# Patient Record
Sex: MALE | Race: WHITE | NOT HISPANIC OR LATINO | URBAN - METROPOLITAN AREA
[De-identification: names, ages, dates, MRNs, and addresses within clinical notes are randomized per-mention and may not be internally consistent; named-entity substitution may affect disease eponyms.]

---

## 2018-09-27 ENCOUNTER — INPATIENT (INPATIENT)
Facility: HOSPITAL | Age: 58
LOS: 8 days | Discharge: HOME | DRG: 435 | End: 2018-10-06
Attending: STUDENT IN AN ORGANIZED HEALTH CARE EDUCATION/TRAINING PROGRAM | Admitting: STUDENT IN AN ORGANIZED HEALTH CARE EDUCATION/TRAINING PROGRAM
Payer: MEDICARE

## 2018-09-27 VITALS
TEMPERATURE: 98 F | OXYGEN SATURATION: 98 % | DIASTOLIC BLOOD PRESSURE: 76 MMHG | RESPIRATION RATE: 16 BRPM | HEART RATE: 85 BPM | WEIGHT: 130.07 LBS | SYSTOLIC BLOOD PRESSURE: 125 MMHG

## 2018-09-27 DIAGNOSIS — Z91.89 OTHER SPECIFIED PERSONAL RISK FACTORS, NOT ELSEWHERE CLASSIFIED: ICD-10-CM

## 2018-09-27 DIAGNOSIS — M19.90 UNSPECIFIED OSTEOARTHRITIS, UNSPECIFIED SITE: ICD-10-CM

## 2018-09-27 DIAGNOSIS — R63.8 OTHER SYMPTOMS AND SIGNS CONCERNING FOOD AND FLUID INTAKE: ICD-10-CM

## 2018-09-27 DIAGNOSIS — Z98.89 OTHER SPECIFIED POSTPROCEDURAL STATES: Chronic | ICD-10-CM

## 2018-09-27 DIAGNOSIS — K86.9 DISEASE OF PANCREAS, UNSPECIFIED: ICD-10-CM

## 2018-09-27 DIAGNOSIS — B20 HUMAN IMMUNODEFICIENCY VIRUS [HIV] DISEASE: ICD-10-CM

## 2018-09-27 DIAGNOSIS — Z29.9 ENCOUNTER FOR PROPHYLACTIC MEASURES, UNSPECIFIED: ICD-10-CM

## 2018-09-27 DIAGNOSIS — F32.9 MAJOR DEPRESSIVE DISORDER, SINGLE EPISODE, UNSPECIFIED: ICD-10-CM

## 2018-09-27 LAB
ALBUMIN SERPL ELPH-MCNC: 3.6 G/DL — SIGNIFICANT CHANGE UP (ref 3.4–5)
ALP SERPL-CCNC: 1220 U/L — HIGH (ref 40–120)
ALT FLD-CCNC: 241 U/L — HIGH (ref 12–42)
ANION GAP SERPL CALC-SCNC: 12 MMOL/L — SIGNIFICANT CHANGE UP (ref 9–16)
AST SERPL-CCNC: 168 U/L — HIGH (ref 15–37)
BASOPHILS NFR BLD AUTO: 0.6 % — SIGNIFICANT CHANGE UP (ref 0–2)
BILIRUB SERPL-MCNC: 9.4 MG/DL — HIGH (ref 0.2–1.2)
BUN SERPL-MCNC: 15 MG/DL — SIGNIFICANT CHANGE UP (ref 7–23)
CALCIUM SERPL-MCNC: 9.4 MG/DL — SIGNIFICANT CHANGE UP (ref 8.5–10.5)
CHLORIDE SERPL-SCNC: 97 MMOL/L — SIGNIFICANT CHANGE UP (ref 96–108)
CO2 SERPL-SCNC: 24 MMOL/L — SIGNIFICANT CHANGE UP (ref 22–31)
CREAT SERPL-MCNC: 1.2 MG/DL — SIGNIFICANT CHANGE UP (ref 0.5–1.3)
EOSINOPHIL NFR BLD AUTO: 3.6 % — SIGNIFICANT CHANGE UP (ref 0–6)
GLUCOSE SERPL-MCNC: 113 MG/DL — HIGH (ref 70–99)
HCT VFR BLD CALC: 36.4 % — LOW (ref 39–50)
HGB BLD-MCNC: 12.5 G/DL — LOW (ref 13–17)
IMM GRANULOCYTES NFR BLD AUTO: 0.4 % — SIGNIFICANT CHANGE UP (ref 0–1.5)
LIDOCAIN IGE QN: 65 U/L — LOW (ref 73–393)
LYMPHOCYTES # BLD AUTO: 10.9 % — LOW (ref 13–44)
MCHC RBC-ENTMCNC: 32.3 PG — SIGNIFICANT CHANGE UP (ref 27–34)
MCHC RBC-ENTMCNC: 34.3 G/DL — SIGNIFICANT CHANGE UP (ref 32–36)
MCV RBC AUTO: 94.1 FL — SIGNIFICANT CHANGE UP (ref 80–100)
MONOCYTES NFR BLD AUTO: 8.1 % — SIGNIFICANT CHANGE UP (ref 2–14)
NEUTROPHILS NFR BLD AUTO: 76.4 % — SIGNIFICANT CHANGE UP (ref 43–77)
PLATELET # BLD AUTO: 194 K/UL — SIGNIFICANT CHANGE UP (ref 150–400)
POTASSIUM SERPL-MCNC: 3.6 MMOL/L — SIGNIFICANT CHANGE UP (ref 3.5–5.3)
POTASSIUM SERPL-SCNC: 3.6 MMOL/L — SIGNIFICANT CHANGE UP (ref 3.5–5.3)
PROT SERPL-MCNC: 7.6 G/DL — SIGNIFICANT CHANGE UP (ref 6.4–8.2)
RBC # BLD: 3.87 M/UL — LOW (ref 4.2–5.8)
RBC # FLD: 14.3 % — SIGNIFICANT CHANGE UP (ref 10.3–14.5)
SODIUM SERPL-SCNC: 133 MMOL/L — SIGNIFICANT CHANGE UP (ref 132–145)
WBC # BLD: 9.1 K/UL — SIGNIFICANT CHANGE UP (ref 3.8–10.5)
WBC # FLD AUTO: 9.1 K/UL — SIGNIFICANT CHANGE UP (ref 3.8–10.5)

## 2018-09-27 PROCEDURE — 71046 X-RAY EXAM CHEST 2 VIEWS: CPT | Mod: 26

## 2018-09-27 PROCEDURE — 99285 EMERGENCY DEPT VISIT HI MDM: CPT | Mod: 25

## 2018-09-27 PROCEDURE — 99222 1ST HOSP IP/OBS MODERATE 55: CPT | Mod: GC

## 2018-09-27 RX ORDER — BUPROPION HYDROCHLORIDE 150 MG/1
150 TABLET, EXTENDED RELEASE ORAL EVERY 24 HOURS
Qty: 0 | Refills: 0 | Status: DISCONTINUED | OUTPATIENT
Start: 2018-09-27 | End: 2018-10-06

## 2018-09-27 RX ORDER — SODIUM CHLORIDE 9 MG/ML
3 INJECTION INTRAMUSCULAR; INTRAVENOUS; SUBCUTANEOUS ONCE
Qty: 0 | Refills: 0 | Status: COMPLETED | OUTPATIENT
Start: 2018-09-27 | End: 2018-09-27

## 2018-09-27 RX ORDER — OXYCODONE HYDROCHLORIDE 5 MG/1
60 TABLET ORAL EVERY 12 HOURS
Qty: 0 | Refills: 0 | Status: DISCONTINUED | OUTPATIENT
Start: 2018-09-28 | End: 2018-10-04

## 2018-09-27 RX ORDER — SODIUM CHLORIDE 9 MG/ML
1000 INJECTION INTRAMUSCULAR; INTRAVENOUS; SUBCUTANEOUS ONCE
Qty: 0 | Refills: 0 | Status: COMPLETED | OUTPATIENT
Start: 2018-09-27 | End: 2018-09-27

## 2018-09-27 RX ORDER — ALPRAZOLAM 0.25 MG
1 TABLET ORAL EVERY 24 HOURS
Qty: 0 | Refills: 0 | Status: DISCONTINUED | OUTPATIENT
Start: 2018-09-27 | End: 2018-10-04

## 2018-09-27 RX ORDER — SODIUM CHLORIDE 9 MG/ML
1000 INJECTION INTRAMUSCULAR; INTRAVENOUS; SUBCUTANEOUS
Qty: 0 | Refills: 0 | Status: DISCONTINUED | OUTPATIENT
Start: 2018-09-27 | End: 2018-09-28

## 2018-09-27 RX ORDER — EMTRICITABINE AND TENOFOVIR DISOPROXIL FUMARATE 200; 300 MG/1; MG/1
1 TABLET, FILM COATED ORAL EVERY 24 HOURS
Qty: 0 | Refills: 0 | Status: DISCONTINUED | OUTPATIENT
Start: 2018-09-27 | End: 2018-10-06

## 2018-09-27 RX ORDER — HEPARIN SODIUM 5000 [USP'U]/ML
5000 INJECTION INTRAVENOUS; SUBCUTANEOUS EVERY 8 HOURS
Qty: 0 | Refills: 0 | Status: DISCONTINUED | OUTPATIENT
Start: 2018-09-27 | End: 2018-10-04

## 2018-09-27 RX ORDER — HYDROMORPHONE HYDROCHLORIDE 2 MG/ML
8 INJECTION INTRAMUSCULAR; INTRAVENOUS; SUBCUTANEOUS EVERY 6 HOURS
Qty: 0 | Refills: 0 | Status: DISCONTINUED | OUTPATIENT
Start: 2018-09-27 | End: 2018-10-04

## 2018-09-27 RX ORDER — DARUNAVIR ETHANOLATE AND COBICISTAT 800; 150 MG/1; MG/1
1 TABLET, FILM COATED ORAL EVERY 24 HOURS
Qty: 0 | Refills: 0 | Status: DISCONTINUED | OUTPATIENT
Start: 2018-09-27 | End: 2018-10-06

## 2018-09-27 RX ADMIN — Medication 1 MILLIGRAM(S): at 22:27

## 2018-09-27 RX ADMIN — DARUNAVIR ETHANOLATE AND COBICISTAT 1 TABLET(S): 800; 150 TABLET, FILM COATED ORAL at 22:27

## 2018-09-27 RX ADMIN — SODIUM CHLORIDE 3 MILLILITER(S): 9 INJECTION INTRAMUSCULAR; INTRAVENOUS; SUBCUTANEOUS at 15:08

## 2018-09-27 RX ADMIN — EMTRICITABINE AND TENOFOVIR DISOPROXIL FUMARATE 1 TABLET(S): 200; 300 TABLET, FILM COATED ORAL at 22:27

## 2018-09-27 RX ADMIN — SODIUM CHLORIDE 1000 MILLILITER(S): 9 INJECTION INTRAMUSCULAR; INTRAVENOUS; SUBCUTANEOUS at 15:08

## 2018-09-27 RX ADMIN — HEPARIN SODIUM 5000 UNIT(S): 5000 INJECTION INTRAVENOUS; SUBCUTANEOUS at 22:26

## 2018-09-27 RX ADMIN — BUPROPION HYDROCHLORIDE 150 MILLIGRAM(S): 150 TABLET, EXTENDED RELEASE ORAL at 22:27

## 2018-09-27 RX ADMIN — SODIUM CHLORIDE 100 MILLILITER(S): 9 INJECTION INTRAMUSCULAR; INTRAVENOUS; SUBCUTANEOUS at 22:46

## 2018-09-27 NOTE — H&P ADULT - ASSESSMENT
57 year old man with past medical history of HIV (CD4 195, viral load undetectable), GERD, osteoarthiritis, and bilateral sciatic presents to the ED for medical evaluation due to elevated liver enzymes noted by his PMD

## 2018-09-27 NOTE — H&P ADULT - PROBLEM SELECTOR PLAN 3
Patient with extensive pain medication for arthritis   - c/w home medications to avoid withdrawal:  - hydromorphone 8mg q 6 hours prn for breakthrough  - oxycontin 60mg q 12 hours standing

## 2018-09-27 NOTE — ED ADULT NURSE NOTE - DISCHARGE DATE/TIME
": 1949  PHONE #'s: [phone] (home) none (work)    PRESENTING PROBLEM:  Just checked his BP at home katherine he felt kind of shaky and it was 183/97. RN asked when he last ate and he said he ate a piece of toast at 7 AM this morning and hasn't eaten anything else since.     NURSING ASSESSMENT  Description: As above. Denies chest pain, NO SOB,  NO headache or blurred vision,  NO nausea or diaphoresis.   Onset/duration:  today  Precip. factors:   Etiology unknown.   Assoc. Sx:   Shaky.   Improves/worsens Sx:  same  Pain scale (1-10)   0/10  Sx specific meds:  \" I was just put on Effexor XR and Norvasc. My pharmacist told me I should be on a betablocker with this Norvasc.\"   RN asked if he is taking his Metoprolol?  \" NO, Dr. Bosch told me to switch to the Losartan on 17. Am I supposed to be taking it along with the New medication?  I guess I really don't know?  I do have some still. \"  Last exam/Tx:   17    RECOMMENDED DISPOSITION:  Home care advice - RN instructed patient to go eats something as it has almost been 8 hours since he last ate anything. Then rest for about a 1/2 hour , then recheck his bp to see if it has come back down since he should no longer be feeling shaky after eating a relaxing. RN will forward message about Metoprolol to PCP.........  .Seek emergency care Immediately If Any of the Following Occur:  * continuous chest pain accompanied with chest tightness, pressure, or  if it is accompanied by:    * Shortness of breath    * dizziness    * Cool, moist skin    * Nausea or vomiting    * Pain in the neck, shoulders, jaw, teeth, back , or arms.     * Blue of gray face, lips, earlobes, or fingernails,    * heart palpiataions.   * NO relief from repeated Nitroglycerin every 5 minutes for three doses.      Will comply with recommendation: YES  If further questions/concerns or if Sx do not improve, worsen or new Sx develop, call your PCP or [facility] as soon as " possible.    NOTES:  Disposition was determined by the first positive assessment question, therefore all previous assessment questions were negative.  Informed to check provider manual or call insurance company to assure coverage.    Guideline used: Hypertension  Telephone Triage Protocols for Nurses, Fifth Edition, Jade Kraus RN  May 8, 2017     27-Sep-2018 18:10

## 2018-09-27 NOTE — ED PROVIDER NOTE - CHPI ED SYMPTOMS NEG
no dysuria/no fever/no hematuria/no vomiting/no blood in stool/no diarrhea/no chills/no nausea/no itching, no rash

## 2018-09-27 NOTE — H&P ADULT - NSHPSOCIALHISTORY_GEN_ALL_CORE
Former smoker quit 10 years ago, smoked for 20 years, no drinking, no drugs Former smoker quit 2months, smoked for 20 years a pack a day, no drinking, no drugs

## 2018-09-27 NOTE — ED ADULT TRIAGE NOTE - CHIEF COMPLAINT QUOTE
pt presents to ED due to elevated liver enzymes that were normal in June 2018. as per pt, noticed skin yellowing approx 4 days ago with abdominal discomfort. pt has CT of abdomen done approx. 1hr PTA (has CD and written results).

## 2018-09-27 NOTE — H&P ADULT - NSHPPHYSICALEXAM_GEN_ALL_CORE
VITAL SIGNS:  T(F): 98.3 (09-27-18 @ 19:45), Max: 98.7 (09-27-18 @ 17:24)  HR: 90 (09-27-18 @ 19:45) (73 - 90)  BP: 130/77 (09-27-18 @ 19:45) (125/76 - 133/80)  BP(mean): --  RR: 16 (09-27-18 @ 19:45) (16 - 16)  SpO2: 99% (09-27-18 @ 19:45) (98% - 100%)    PHYSICAL EXAM:    Constitutional: WDWN resting comfortably in bed; NAD  HEENT: NC/AT, PERRL, EOMI, anicteric sclera, no nasal discharge; uvula midline, no oropharyngeal erythema or exudates; MMM  Neck: supple; no JVD or thyromegaly  Respiratory: CTA B/L; no W/R/R, no retractions  Cardiac: +S1/S2; RRR; no M/R/G; PMI non-displaced  Gastrointestinal: soft, NT/ND; no rebound or guarding; +BSx4  Genitourinary: normal external genitalia  Back: spine midline, no bony tenderness or step-offs; no CVAT B/L  Extremities: WWP, no clubbing or cyanosis; no peripheral edema  Musculoskeletal: NROM x4; no joint swelling, tenderness or erythema  Vascular: 2+ radial, femoral, DP/PT pulses B/L  Dermatologic: skin warm, dry and intact; no rashes, wounds, or scars  Lymphatic: no submandibular or cervical LAD  Neurologic: AAOx3; CNII-XII grossly intact; no focal deficits  Psychiatric: affect and characteristics of appearance, verbalizations, behaviors are appropriate, denies SI/HI/AH/VH VITAL SIGNS:  T(F): 98.3 (09-27-18 @ 19:45), Max: 98.7 (09-27-18 @ 17:24)  HR: 90 (09-27-18 @ 19:45) (73 - 90)  BP: 130/77 (09-27-18 @ 19:45) (125/76 - 133/80)  BP(mean): --  RR: 16 (09-27-18 @ 19:45) (16 - 16)  SpO2: 99% (09-27-18 @ 19:45) (98% - 100%)    PHYSICAL EXAM:  Constitutional: thin appearing gentleman; NAD  HEENT: NC/AT, PERRL, EOMI, anicteric sclera, no nasal discharge; uvula midline, no oropharyngeal erythema or exudates; DMM  Neck: supple; no JVD or thyromegaly  Respiratory: CTA B/L; no W/R/R, no retractions  Cardiac: +S1/S2; RRR; no M/R/G; PMI non-displaced  Gastrointestinal: soft, NT/ND; no rebound or guarding; +BSx4  Back: no CVAT B/L  Extremities: WWP, no clubbing or cyanosis; no peripheral edema  Vascular: 2+ radial, femoral, DP/PT pulses B/L  Dermatologic: skin warm, dry and intact; no rashes, wounds, or scars. Jaundiced.  Lymphatic: no submandibular or cervical LAD  Neurologic: AAOx3; CNII-XII grossly intact; no focal deficits  Psychiatric: affect and characteristics of appearance, verbalizations, behaviors are appropriate, denies SI/HI/AH/VH

## 2018-09-27 NOTE — H&P ADULT - PROBLEM SELECTOR PLAN 7
1) PCP Contacted on Admission: (Y/N) --> Name & Phone #: Dr. Sommers  2) Date of Contact with PCP:  3) PCP Contacted at Discharge: (Y/N)  4) Summary of Handoff Given to PCP:   5) Post-Discharge Appointment Date and Location:

## 2018-09-27 NOTE — H&P ADULT - NSHPLABSRESULTS_GEN_ALL_CORE
LABS:                         12.5   9.1   )-----------( 194      ( 27 Sep 2018 15:11 )             36.4     09-27    133  |  97  |  15  ----------------------------<  113<H>  3.6   |  24  |  1.20    Ca    9.4      27 Sep 2018 15:11    TPro  7.6  /  Alb  3.6  /  TBili  9.4<H>  /  DBili  x   /  AST  168<H>  /  ALT  241<H>  /  AlkPhos  1220<H>  09-27                  RADIOLOGY, EKG & ADDITIONAL TESTS: Reviewed.

## 2018-09-27 NOTE — ED PROVIDER NOTE - PROGRESS NOTE DETAILS
LFT's are remarkably elevated. CXR negative, will admit to medicine for pancreatic mass and jaundice. accepted to regional medicine by Dr Hope    PCP  accepted to regional medicine by Dr Hope    PCP Dr. Sommers [phone]  Dr Dominguez [phone]

## 2018-09-27 NOTE — H&P ADULT - NSHPREVIEWOFSYSTEMS_GEN_ALL_CORE
REVIEW OF SYSTEMS:  CONSTITUTIONAL: Patient denies weakness, fevers or chills  EYES/ENT: Patient denies visual changes;  denies vertigo or throat pain   NECK: Patient denies pain or stiffness  RESPIRATORY: Patient denies cough, wheezing, hemoptysis; denies shortness of breath  CARDIOVASCULAR: Patient denies chest pain or palpitations  GASTROINTESTINAL: +abdominal pain and cramping,  no nausea, vomiting, or hematemesis, diarrhea or constipation, melena or hematochezia.  GENITOURINARY: Patient denies dysuria, frequency or hematuria  NEUROLOGICAL: Patient denies numbness or weakness  SKIN: + yellowing of skin, patient denies itching, burning, rashes, or lesions   All other review of systems is negative unless indicated above.

## 2018-09-27 NOTE — H&P ADULT - PROBLEM SELECTOR PLAN 1
Patient had a CT scan today due to his abnormal labs and complaints w/ results reporting a 3cm x 2cm x 2cm mass on his pancreas w/ associated jaundice and abdominal cramping  GI consulted- for ERCP tomorrow  - NPO after midnight, clears today  - CA 19-9, CEA in the AM  - trend LFTs   - CT chest with IV contrast for staging as per GI  - patient with poor PO intake IV fluids 100 cc/hr

## 2018-09-27 NOTE — ED ADULT NURSE NOTE - OBJECTIVE STATEMENT
pt. sent by PMD for jaundice and a pancreatic mass found on his ct-scan. Pt. reports feeling fine, no complaints, skin discoloration began about 4 days ago.

## 2018-09-27 NOTE — ED PROVIDER NOTE - OBJECTIVE STATEMENT
57 y o male with PMHX of HIV (CD4 195, Viral load undetectable), GERD, arthritis, and bilateral sciatica presents to the ED for medical evaluation after phone call this morning 1 hr PTA. PT was called by his doctor 2 weeks ago due to elevated liver enzymes, compared to normal levels in June 2018. Noted jaundice and abdominal cramping + bloating + fatigue over the past 4 days. Pt had a CT scan this morning w/ results reports a 3cm x 2cm x 2cm mass on his pancreas. Pt denies hx and FHx of CA. Denies fevers, chills, N/V/D, itching, rash, bloody stool, or any other sx.

## 2018-09-27 NOTE — PATIENT PROFILE ADULT. - VISION (WITH CORRECTIVE LENSES IF THE PATIENT USUALLY WEARS THEM):
Normal vision: sees adequately in most situations; can see medication labels, newsprint/wears reading eyeglasses

## 2018-09-27 NOTE — H&P ADULT - HISTORY OF PRESENT ILLNESS
CC: Yellowing of skin x 4 days + abdominal discomfort.  HPI: 57 year old man with past medical history of HIV (CD4 195, viral load undetectable), GERD, osteoarthiritis, and bilateral sciatic presents to the ED for medical evaluation due to elevated liver enzymes noted by his PMD. Additionally with complaints of yellowing of skin and abdominal discomfort x 4 days.  Additionally with complaints of increased fatigue over the past 4 days.  Patient had a CT scan today due to his abnormal labs and complaints w/ results reporting a 3cm x 2cm x 2cm mass on his pancreas. Patient without family history of malignancy. Denies fevers, chills, nausea, vomiting, diarrhea, itching, rash, hematochezia or melena.   PMHX:  Bilateral sciatica  Arthritis  Gastric reflux  HIV (human immunodeficiency virus infection)  PSHX:  H/O inguinal hernia repair  H/O laminectomy  Allergies  penicillins (Angioedema)  FAMILY HISTORY:  No pertinent family history in first degree relatives  SHx:   Former smoker quit 10 years ago, smoked for 20 years. No drinking, no drugs.  ED Course: Vitals in the ED T 98.1, HR 85, R 16, /76, S 98% on RA. CC: Yellowing of skin x 4 days + abdominal discomfort.  HPI: 57 year old man with past medical history of HIV (CD4 195, viral load undetectable), GERD, osteoarthiritis, and bilateral sciatic presents to the ED for medical evaluation due to elevated liver enzymes noted by his PMD. Additionally with complaints of yellowing of skin and abdominal discomfort x 4 days.  Additionally with complaints of increased fatigue over the past 4 days.  Patient had a CT scan today due to his abnormal labs w/ results reporting a 3cm x 2cm x 2cm mass on his pancreas. Patient without family history of malignancy. Denies fevers, chills, nausea, vomiting, diarrhea, itching, rash, hematochezia or melena.   PMHX:  Bilateral sciatica  Arthritis  HIV (human immunodeficiency virus infection)  PSHX:  H/O inguinal hernia repair  H/O laminectomy  Allergies  penicillins (Angioedema)  FAMILY HISTORY:  Heart disease mom and dad.  SHx:   Former smoker quit 2 months ago, smoked for 20 years. No drinking, no drugs.  ED Course: Vitals in the ED T 98.1, HR 85, R 16, /76, S 98% on RA. Patient ED given 1L of fluid admitted for malignancy workup.

## 2018-09-27 NOTE — ED PROVIDER NOTE - EYES, MLM
Clear bilaterally, pupils equal, round and reactive to light. Jaundice in the scleral ictus. Clear bilaterally, pupils equal, round and reactive to light. Jaundiced scleral ictus.

## 2018-09-27 NOTE — ED PROVIDER NOTE - MEDICAL DECISION MAKING DETAILS
Jaundice with elevated liver enzymes and pancreatic mass. Concerned for pancreatic CA. Will resend labs and admit to [facility] for MRCP and further workup and treatment as indicated.

## 2018-09-27 NOTE — ED ADULT TRIAGE NOTE - TEMPERATURE IN FAHRENHEIT (DEGREES F)
Start pristiq.     Decrease lorazepam to twice daily from three times daily.     Follow up with me 3:20 august 13th and will continue opiod wean.     I will discuss medical marijuana with pain clinic and we can discuss at the next visit.       Seeking suboxone , has appointment with Dr. Uribe addiction med coming up mid august.     Continue to engage in pain psychotherapy.        98.1

## 2018-09-27 NOTE — ED PROVIDER NOTE - DIAGNOSTIC INTERPRETATION
Interpreted by ED physician: Randy Bunn  Chest x-ray, 2 views  Lungs clear, heart shadow normal, bony structures normal, no free air under diaphragm, no PTX

## 2018-09-28 LAB
ALBUMIN SERPL ELPH-MCNC: 3.7 G/DL — SIGNIFICANT CHANGE UP (ref 3.3–5)
ALP SERPL-CCNC: 909 U/L — HIGH (ref 40–120)
ALT FLD-CCNC: 192 U/L — HIGH (ref 10–45)
ANION GAP SERPL CALC-SCNC: 13 MMOL/L — SIGNIFICANT CHANGE UP (ref 5–17)
AST SERPL-CCNC: 163 U/L — HIGH (ref 10–40)
BASOPHILS NFR BLD AUTO: 0.1 % — SIGNIFICANT CHANGE UP (ref 0–2)
BILIRUB DIRECT SERPL-MCNC: 8.4 MG/DL — HIGH (ref 0–0.2)
BILIRUB INDIRECT FLD-MCNC: 2.7 MG/DL — HIGH (ref 0.2–1)
BILIRUB SERPL-MCNC: 11.1 MG/DL — HIGH (ref 0.2–1.2)
BILIRUB SERPL-MCNC: 11.1 MG/DL — HIGH (ref 0.2–1.2)
BUN SERPL-MCNC: 13 MG/DL — SIGNIFICANT CHANGE UP (ref 7–23)
CALCIUM SERPL-MCNC: 9.5 MG/DL — SIGNIFICANT CHANGE UP (ref 8.4–10.5)
CEA SERPL-MCNC: 3.4 NG/ML — SIGNIFICANT CHANGE UP (ref 0–3.8)
CHLORIDE SERPL-SCNC: 102 MMOL/L — SIGNIFICANT CHANGE UP (ref 96–108)
CO2 SERPL-SCNC: 23 MMOL/L — SIGNIFICANT CHANGE UP (ref 22–31)
CREAT SERPL-MCNC: 0.9 MG/DL — SIGNIFICANT CHANGE UP (ref 0.5–1.3)
EOSINOPHIL NFR BLD AUTO: 2.6 % — SIGNIFICANT CHANGE UP (ref 0–6)
GLUCOSE SERPL-MCNC: 151 MG/DL — HIGH (ref 70–99)
HCT VFR BLD CALC: 34.8 % — LOW (ref 39–50)
HGB BLD-MCNC: 11.6 G/DL — LOW (ref 13–17)
HIV-1 VIRAL LOAD RESULT: SIGNIFICANT CHANGE UP
HIV1 RNA # SERPL NAA+PROBE: SIGNIFICANT CHANGE UP
HIV1 RNA SER-IMP: SIGNIFICANT CHANGE UP
HIV1 RNA SERPL NAA+PROBE-ACNC: SIGNIFICANT CHANGE UP
HIV1 RNA SERPL NAA+PROBE-LOG#: SIGNIFICANT CHANGE UP LG COP/ML
LYMPHOCYTES # BLD AUTO: 14.5 % — SIGNIFICANT CHANGE UP (ref 13–44)
MAGNESIUM SERPL-MCNC: 1.9 MG/DL — SIGNIFICANT CHANGE UP (ref 1.6–2.6)
MCHC RBC-ENTMCNC: 31.1 PG — SIGNIFICANT CHANGE UP (ref 27–34)
MCHC RBC-ENTMCNC: 33.3 G/DL — SIGNIFICANT CHANGE UP (ref 32–36)
MCV RBC AUTO: 93.3 FL — SIGNIFICANT CHANGE UP (ref 80–100)
MONOCYTES NFR BLD AUTO: 10 % — SIGNIFICANT CHANGE UP (ref 2–14)
NEUTROPHILS NFR BLD AUTO: 72.8 % — SIGNIFICANT CHANGE UP (ref 43–77)
PHOSPHATE SERPL-MCNC: 3.2 MG/DL — SIGNIFICANT CHANGE UP (ref 2.5–4.5)
PLATELET # BLD AUTO: 181 K/UL — SIGNIFICANT CHANGE UP (ref 150–400)
POTASSIUM SERPL-MCNC: 4.3 MMOL/L — SIGNIFICANT CHANGE UP (ref 3.5–5.3)
POTASSIUM SERPL-SCNC: 4.3 MMOL/L — SIGNIFICANT CHANGE UP (ref 3.5–5.3)
PROT SERPL-MCNC: 6.1 G/DL — SIGNIFICANT CHANGE UP (ref 6–8.3)
RBC # BLD: 3.73 M/UL — LOW (ref 4.2–5.8)
RBC # FLD: 14.7 % — SIGNIFICANT CHANGE UP (ref 10.3–16.9)
SODIUM SERPL-SCNC: 138 MMOL/L — SIGNIFICANT CHANGE UP (ref 135–145)
WBC # BLD: 6.8 K/UL — SIGNIFICANT CHANGE UP (ref 3.8–10.5)
WBC # FLD AUTO: 6.8 K/UL — SIGNIFICANT CHANGE UP (ref 3.8–10.5)

## 2018-09-28 PROCEDURE — 99233 SBSQ HOSP IP/OBS HIGH 50: CPT | Mod: GC

## 2018-09-28 PROCEDURE — 71250 CT THORAX DX C-: CPT | Mod: 26

## 2018-09-28 RX ORDER — EMTRICITABINE AND TENOFOVIR DISOPROXIL FUMARATE 200; 300 MG/1; MG/1
1 TABLET, FILM COATED ORAL
Qty: 0 | Refills: 0 | COMMUNITY

## 2018-09-28 RX ORDER — HYDROMORPHONE HYDROCHLORIDE 2 MG/ML
1 INJECTION INTRAMUSCULAR; INTRAVENOUS; SUBCUTANEOUS
Qty: 0 | Refills: 0 | COMMUNITY

## 2018-09-28 RX ORDER — OXYCODONE HYDROCHLORIDE 5 MG/1
1 TABLET ORAL
Qty: 0 | Refills: 0 | COMMUNITY

## 2018-09-28 RX ORDER — CEFTRIAXONE 500 MG/1
1 INJECTION, POWDER, FOR SOLUTION INTRAMUSCULAR; INTRAVENOUS ONCE
Qty: 0 | Refills: 0 | Status: DISCONTINUED | OUTPATIENT
Start: 2018-09-28 | End: 2018-09-28

## 2018-09-28 RX ORDER — DARUNAVIR ETHANOLATE AND COBICISTAT 800; 150 MG/1; MG/1
1 TABLET, FILM COATED ORAL
Qty: 0 | Refills: 0 | COMMUNITY

## 2018-09-28 RX ORDER — METRONIDAZOLE 500 MG
500 TABLET ORAL EVERY 8 HOURS
Qty: 0 | Refills: 0 | Status: DISCONTINUED | OUTPATIENT
Start: 2018-09-28 | End: 2018-10-03

## 2018-09-28 RX ORDER — BUPROPION HYDROCHLORIDE 150 MG/1
1 TABLET, EXTENDED RELEASE ORAL
Qty: 0 | Refills: 0 | COMMUNITY

## 2018-09-28 RX ORDER — ALPRAZOLAM 0.25 MG
1 TABLET ORAL
Qty: 0 | Refills: 0 | COMMUNITY

## 2018-09-28 RX ORDER — SODIUM CHLORIDE 9 MG/ML
1000 INJECTION, SOLUTION INTRAVENOUS
Qty: 0 | Refills: 0 | Status: DISCONTINUED | OUTPATIENT
Start: 2018-09-28 | End: 2018-09-30

## 2018-09-28 RX ADMIN — DARUNAVIR ETHANOLATE AND COBICISTAT 1 TABLET(S): 800; 150 TABLET, FILM COATED ORAL at 22:59

## 2018-09-28 RX ADMIN — HEPARIN SODIUM 5000 UNIT(S): 5000 INJECTION INTRAVENOUS; SUBCUTANEOUS at 06:21

## 2018-09-28 RX ADMIN — HYDROMORPHONE HYDROCHLORIDE 8 MILLIGRAM(S): 2 INJECTION INTRAMUSCULAR; INTRAVENOUS; SUBCUTANEOUS at 15:35

## 2018-09-28 RX ADMIN — SODIUM CHLORIDE 100 MILLILITER(S): 9 INJECTION INTRAMUSCULAR; INTRAVENOUS; SUBCUTANEOUS at 06:21

## 2018-09-28 RX ADMIN — HEPARIN SODIUM 5000 UNIT(S): 5000 INJECTION INTRAVENOUS; SUBCUTANEOUS at 23:02

## 2018-09-28 RX ADMIN — EMTRICITABINE AND TENOFOVIR DISOPROXIL FUMARATE 1 TABLET(S): 200; 300 TABLET, FILM COATED ORAL at 22:59

## 2018-09-28 RX ADMIN — OXYCODONE HYDROCHLORIDE 60 MILLIGRAM(S): 5 TABLET ORAL at 07:25

## 2018-09-28 RX ADMIN — SODIUM CHLORIDE 125 MILLILITER(S): 9 INJECTION, SOLUTION INTRAVENOUS at 15:35

## 2018-09-28 RX ADMIN — SODIUM CHLORIDE 125 MILLILITER(S): 9 INJECTION, SOLUTION INTRAVENOUS at 23:26

## 2018-09-28 RX ADMIN — Medication 500 MILLIGRAM(S): at 22:59

## 2018-09-28 RX ADMIN — HEPARIN SODIUM 5000 UNIT(S): 5000 INJECTION INTRAVENOUS; SUBCUTANEOUS at 15:35

## 2018-09-28 RX ADMIN — OXYCODONE HYDROCHLORIDE 60 MILLIGRAM(S): 5 TABLET ORAL at 06:21

## 2018-09-28 RX ADMIN — BUPROPION HYDROCHLORIDE 150 MILLIGRAM(S): 150 TABLET, EXTENDED RELEASE ORAL at 23:02

## 2018-09-28 RX ADMIN — HYDROMORPHONE HYDROCHLORIDE 8 MILLIGRAM(S): 2 INJECTION INTRAMUSCULAR; INTRAVENOUS; SUBCUTANEOUS at 23:15

## 2018-09-28 RX ADMIN — HYDROMORPHONE HYDROCHLORIDE 8 MILLIGRAM(S): 2 INJECTION INTRAMUSCULAR; INTRAVENOUS; SUBCUTANEOUS at 16:15

## 2018-09-28 RX ADMIN — Medication 500 MILLIGRAM(S): at 15:35

## 2018-09-28 RX ADMIN — Medication 1 MILLIGRAM(S): at 23:02

## 2018-09-28 RX ADMIN — OXYCODONE HYDROCHLORIDE 60 MILLIGRAM(S): 5 TABLET ORAL at 17:44

## 2018-09-28 RX ADMIN — OXYCODONE HYDROCHLORIDE 60 MILLIGRAM(S): 5 TABLET ORAL at 18:08

## 2018-09-28 NOTE — DIETITIAN INITIAL EVALUATION ADULT. - ORAL INTAKE PTA
Pt reports declining/non-existent appetite x10 years. Diet recall: 1 meal/day: chicken, brown rice, broccoli, sometimes pasta, with 1-2 protein shakes/day, and 1 milkshake/day (1/2 pint regular ice cream with whole milk)

## 2018-09-28 NOTE — PROGRESS NOTE ADULT - SUBJECTIVE AND OBJECTIVE BOX
OVERNIGHT EVENTS:  CLARITA  SUBJECTIVE / INTERVAL HPI: Patient seen and examined at bedside.   Patient laying comfortably in bed, awake and alert with no active complaints overnight.   Patient denies h/n/v/d, fever, chills, cp, palpitations, sob, abd pain, leg swelling, rashes, and dysuria. Notes BM are loose, and foul smelling.     VITAL SIGNS:  Vital Signs Last 24 Hrs  T(C): 36.6 (28 Sep 2018 08:59), Max: 37.2 (27 Sep 2018 20:40)  T(F): 97.9 (28 Sep 2018 08:59), Max: 99 (27 Sep 2018 20:40)  HR: 68 (28 Sep 2018 08:59) (68 - 90)  BP: 116/74 (28 Sep 2018 08:59) (98/57 - 135/77)  BP(mean): --  RR: 17 (28 Sep 2018 08:59) (15 - 17)  SpO2: 97% (28 Sep 2018 08:59) (97% - 100%)    PHYSICAL EXAM:    General: WDWN, cachetic, yellow appearing  HEENT: NC/AT; PERRL, icteric sclera; MMM  Neck: supple  Cardiovascular: +S1/S2; RRR  Respiratory: CTA B/L; no W/R/R  Gastrointestinal: soft, NT/ND; +BSx4, no masses  Extremities: WWP; no edema, clubbing or cyanosis, muscle wasting evident  Vascular: 2+ radial, DP/PT pulses B/L  Neurological: AAOx3; no focal deficits    MEDICATIONS:  MEDICATIONS  (STANDING):  ALPRAZolam 1 milliGRAM(s) Oral every 24 hours  buPROPion XL . 150 milliGRAM(s) Oral every 24 hours  darunavir 800 mG/cobicstat 150 mG 1 Tablet(s) Oral every 24 hours  emtricitabine 200 mG/tenofovir alafenamide 25 mG (DESCOVY) Tablet 1 Tablet(s) Oral every 24 hours  heparin  Injectable 5000 Unit(s) SubCutaneous every 8 hours  lactated ringers. 1000 milliLiter(s) (125 mL/Hr) IV Continuous <Continuous>  oxyCODONE  ER Tablet 60 milliGRAM(s) Oral every 12 hours    MEDICATIONS  (PRN):  HYDROmorphone   Tablet 8 milliGRAM(s) Oral every 6 hours PRN Moderate Pain (4 - 6)      ALLERGIES:  Allergies    penicillins (Angioedema)    Intolerances        LABS:                        11.6   6.8   )-----------( 181      ( 28 Sep 2018 06:19 )             34.8     09-28    138  |  102  |  13  ----------------------------<  151<H>  4.3   |  23  |  0.90    Ca    9.5      28 Sep 2018 06:19  Phos  3.2     09-28  Mg     1.9     09-28    TPro  6.1  /  Alb  3.7  /  TBili  11.1<H>  /  DBili  8.4<H>  /  AST  163<H>  /  ALT  192<H>  /  AlkPhos  909<H>  09-28        CAPILLARY BLOOD GLUCOSE          RADIOLOGY & ADDITIONAL TESTS: Reviewed.

## 2018-09-28 NOTE — DIETITIAN INITIAL EVALUATION ADULT. - OTHER INFO
Pt seen for consult- BMI<18kg/m2. Admit: yellowing of skin x4 days and abdominal discomfort, elevated liver enzymes S/P CT scan with results reporting mass on pancreas with associated jaundice. S/P ERCP today w/ sphincterotomy and biopsy, pending results. PMH: HIV, GERD, OA, b/l sciatic. Skin: no edema, intact, +yellow coloring. Pt is NPO. Reported UBW of 60.5kg x1 year ago with 3.6kg unintentional wt loss and reported current wt of 56.8kg, admit wt 59kg with new wt (9/27) 50.4kg (question wt accuracy, appreciate new wts taken)(unable to assess %wt loss). Supplements at home: antioxidants, amino acids, Vitamin B1, D3, C, calcium, magnesium, turmeric, milk thistle, chlorella, B complex, fish oil. NKFA. Denies N/V/C/D with last BM yesterday (reported some N last night, now resolved).

## 2018-09-28 NOTE — CONSULT NOTE ADULT - SUBJECTIVE AND OBJECTIVE BOX
HPI:  56 YO M h/o HIV (CD4 195, VL UD), nutcracker esophagus, OA, B/L sciatica referred by his PMD for abnormal LFTs and CT scan. Pt states that 2 weeks ago his PMD noted that his LFTs were elevated and he was referred for a CT scan which he obtained yesterday. CT abdomen showed a 3.0 x 2.3 x 2.3 cm pancreatic tail mass, nonspecific PD dilation in the head and body, no obvsiou pancreatic head mass, splenic varices, minimal intrahepatic ductal dilatation, no hepatic masses, borderline dilated CBD at 10 mm, mesenteric adenopathy. Pt reports jaundice a 4 days assocaited with pale stool and dark urine. pt reports chronic abdominal discomfort described as bloating and gas for 10 years. 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 Colonoscopy 1 year ago with reported polyps and recommendation to repeat in 5 years, does not recall performing physician. Denies fever, chills, nausea, vomiting, melena, hematochezia, itching, CP, SOB.      Allergies    penicillins (Angioedema)    Intolerances    Home Medications:  buPROPion 150 mg/24 hours (XL) oral tablet, extended release: 1 tab(s) orally every 24 hours (28 Sep 2018 07:55)  Descovy 200 mg-25 mg oral tablet: 1 tab(s) orally once a day (28 Sep 2018 07:53)  HYDROmorphone 8 mg oral tablet: 1 tab(s) orally every 6 hours, As Needed (28 Sep 2018 07:52)  OxyCONTIN 60 mg oral tablet, extended release: 1 tab(s) orally every 12 hours (28 Sep 2018 07:53)  Prezcobix 800 mg-150 mg oral tablet: 1 tab(s) orally once a day (28 Sep 2018 07:54)  Xanax 1 mg oral tablet: 1 cap(s) orally once a day (at bedtime) (28 Sep 2018 07:55)    MEDICATIONS:  MEDICATIONS  (STANDING):  ALPRAZolam 1 milliGRAM(s) Oral every 24 hours  buPROPion XL . 150 milliGRAM(s) Oral every 24 hours  darunavir 800 mG/cobicstat 150 mG 1 Tablet(s) Oral every 24 hours  emtricitabine 200 mG/tenofovir alafenamide 25 mG (DESCOVY) Tablet 1 Tablet(s) Oral every 24 hours  heparin  Injectable 5000 Unit(s) SubCutaneous every 8 hours  lactated ringers. 1000 milliLiter(s) (125 mL/Hr) IV Continuous <Continuous>  oxyCODONE  ER Tablet 60 milliGRAM(s) Oral every 12 hours    MEDICATIONS  (PRN):  HYDROmorphone   Tablet 8 milliGRAM(s) Oral every 6 hours PRN Moderate Pain (4 - 6)    PAST MEDICAL & SURGICAL HISTORY:  Bilateral sciatica  Arthritis  Gastric reflux  HIV (human immunodeficiency virus infection)  H/O inguinal hernia repair  H/O laminectomy    FAMILY HISTORY:  No pertinent family history in first degree relatives  No significant family history of CRC, gastric CA, liver disease, pancreatic CA    SOCIAL HISTORY:  [employer]: Quit 2 months ago, 20-22 pack years  Alcohol: Occasional 2 glasses of wine 3-4 x week  Illicit Drugs: Denies    REVIEW OF SYSTEMS:  All other 10 review of systems is negative unless indicated above.    Vital Signs Last 24 Hrs  T(C): 36.6 (28 Sep 2018 08:59), Max: 37.2 (27 Sep 2018 20:40)  T(F): 97.9 (28 Sep 2018 08:59), Max: 99 (27 Sep 2018 20:40)  HR: 68 (28 Sep 2018 08:59) (68 - 90)  BP: 116/74 (28 Sep 2018 08:59) (98/57 - 135/77)  BP(mean): --  RR: 17 (28 Sep 2018 08:59) (15 - 17)  SpO2: 97% (28 Sep 2018 08:59) (97% - 100%)    PHYSICAL EXAM:    General: Well developed; frail; in no acute distress, jaundice  Eyes: Scleral icterus B/L  HENT: Moist mucous membranes  Neck: Trachea midline, supple  Lungs: Normal respiratory effort and no intercostal retractions  Cardiovascular: RRR  Abdomen: Soft, non-tender non-distended;  No rebound or guarding  Extremities: Normal range of motion, No clubbing, cyanosis or edema  Neurological: Alert and oriented x3  Skin: Warm and dry. No obvious rash    LABS:                        11.6   6.8   )-----------( 181      ( 28 Sep 2018 06:19 )             34.8     09-28    138  |  102  |  13  ----------------------------<  151<H>  4.3   |  23  |  0.90    Ca    9.5      28 Sep 2018 06:19  Phos  3.2     09-28  Mg     1.9     09-28    TPro  6.1  /  Alb  3.7  /  TBili  11.1<H>  /  DBili  8.4<H>  /  AST  163<H>  /  ALT  192<H>  /  AlkPhos  909<H>  09-28    RADIOLOGY & ADDITIONAL STUDIES:     Xray Chest 2 Views PA/Lat (09.27.18 @ 15:16)   IMPRESSION: No focal infiltrates.

## 2018-09-28 NOTE — DIETITIAN INITIAL EVALUATION ADULT. - NS AS NUTRI INTERV MEALS SNACK
As medically feasible, recommend advance diet to regular. Discussed increased PO intake, increased consumption of protein rich, energy dense foods.

## 2018-09-28 NOTE — DIETITIAN INITIAL EVALUATION ADULT. - NS AS NUTRI INTERV MEDICAL AND FOOD SUPPLEMENTS
If diet advanced to clear liquid diet, recommend add Ensure Clear x3/day. If diet advanced to regular, recommend add Ensure Enlive x3/day.

## 2018-09-28 NOTE — CHART NOTE - NSCHARTNOTEFT_GEN_A_CORE
Upon Nutritional Assessment by the Registered Dietitian your patient was determined to meet criteria / has evidence of the following diagnosis/diagnoses:          [ ]  Mild Protein Calorie Malnutrition        [ ]  Moderate Protein Calorie Malnutrition        [ x ] Severe Protein Calorie Malnutrition        [ ] Unspecified Protein Calorie Malnutrition        [ ] Underweight / BMI <19        [ ] Morbid Obesity / BMI > 40      Findings as based on:  •  Comprehensive nutrition assessment and consultation  Clavicle, temporal, tricep w/ severe wasting, <75% nutrition needs >1month      Treatment:    The following diet has been recommended:  As medically feasible, recommend advance diet to regular or clear liquid diet.  If diet advanced to clear liquid diet, recommend add Ensure Clear x3/day. If diet advanced to regular, recommend add Ensure Enlive x3/day.  Discussed increased PO intake, increased consumption of protein rich, energy dense foods.    PROVIDER Section:     By signing this assessment you are acknowledging and agree with the diagnosis/diagnoses assigned by the Registered Dietitian    Comments:

## 2018-09-28 NOTE — CONSULT NOTE ADULT - SUBJECTIVE AND OBJECTIVE BOX
57 year old man with past medical history of HIV (CD4 195, viral load undetectable), GERD, osteoarthiritis, and bilateral sciatic presents due to elevated liver enzymes noted by his PMD, jaundice and abdominal pain. Outside CT scan demonstrated 3cm x 2cm x 2cm mass on his pancreas. Patient underwent ERCP w/ GI today w/ sphincterotomy and biopsy, unable to place pancreatic stent. General surgery consulted for surgical evaluation of pancreatic mass.      PAST MEDICAL & SURGICAL HISTORY:  Bilateral sciatica  Arthritis  Gastric reflux  HIV (human immunodeficiency virus infection)  H/O inguinal hernia repair  H/O laminectomy    MEDICATIONS  (STANDING):  ALPRAZolam 1 milliGRAM(s) Oral every 24 hours  buPROPion XL . 150 milliGRAM(s) Oral every 24 hours  darunavir 800 mG/cobicstat 150 mG 1 Tablet(s) Oral every 24 hours  emtricitabine 200 mG/tenofovir alafenamide 25 mG (DESCOVY) Tablet 1 Tablet(s) Oral every 24 hours  heparin  Injectable 5000 Unit(s) SubCutaneous every 8 hours  lactated ringers. 1000 milliLiter(s) (125 mL/Hr) IV Continuous <Continuous>  levoFLOXacin  Tablet 500 milliGRAM(s) Oral every 24 hours  metroNIDAZOLE    Tablet 500 milliGRAM(s) Oral every 8 hours  oxyCODONE  ER Tablet 60 milliGRAM(s) Oral every 12 hours    Allergies  penicillins (Angioedema)    FAMILY HISTORY:  No pertinent family history in first degree relatives    Vital Signs Last 24 Hrs  T(C): 36.6 (28 Sep 2018 08:59), Max: 37.2 (27 Sep 2018 20:40)  T(F): 97.9 (28 Sep 2018 08:59), Max: 99 (27 Sep 2018 20:40)  HR: 68 (28 Sep 2018 08:59) (68 - 90)  BP: 116/74 (28 Sep 2018 08:59) (98/57 - 135/77)  BP(mean): --  RR: 17 (28 Sep 2018 08:59) (15 - 17)  SpO2: 97% (28 Sep 2018 08:59) (97% - 100%)    PHYSICAL EXAM  General: cachetic, jaundiced   HEENT: NCAT  Resp: CTA BL   Cardiovascular: RRR  Gastrointestinal: soft, NT/ND, no obvious masses / hernia   Extremities: WWP  Neurological: no focal deficits    LABS:                        11.6   6.8   )-----------( 181      ( 28 Sep 2018 06:19 )             34.8     09-28    138  |  102  |  13  ----------------------------<  151<H>  4.3   |  23  |  0.90    Ca    9.5      28 Sep 2018 06:19  Phos  3.2     09-28  Mg     1.9     09-28    TPro  6.1  /  Alb  3.7  /  TBili  11.1<H>  /  DBili  8.4<H>  /  AST  163<H>  /  ALT  192<H>  /  AlkPhos  909<H>  09-28      RADIOLOGY & ADDITIONAL STUDIES: Outside ct abd / pelvis w/ pancreatic mass   CT scan chest performed to rule out metastasis, pending read 57 year old man with past medical history of HIV (CD4 195, viral load undetectable), GERD, osteoarthiritis, and bilateral sciatic presents due to elevated liver enzymes noted by his PMD, jaundice and abdominal pain. Outside CT scan demonstrated 3cm x 2cm x 2cm mass on his pancreas. Patient underwent ERCP w/ GI today w/ sphincterotomy and biopsy of ampula, however unable to pass scope through the ampula and place pancreatic stent. Plan to attempt ERCP on Monday No attempts at EUS performed for biopsy of pancreatic tail mass. General surgery consulted for surgical evaluation of pancreatic mass.      PAST MEDICAL & SURGICAL HISTORY:  Bilateral sciatica  Arthritis  Gastric reflux  HIV (human immunodeficiency virus infection)  H/O inguinal hernia repair  H/O laminectomy    MEDICATIONS  (STANDING):  ALPRAZolam 1 milliGRAM(s) Oral every 24 hours  buPROPion XL . 150 milliGRAM(s) Oral every 24 hours  darunavir 800 mG/cobicstat 150 mG 1 Tablet(s) Oral every 24 hours  emtricitabine 200 mG/tenofovir alafenamide 25 mG (DESCOVY) Tablet 1 Tablet(s) Oral every 24 hours  heparin  Injectable 5000 Unit(s) SubCutaneous every 8 hours  lactated ringers. 1000 milliLiter(s) (125 mL/Hr) IV Continuous <Continuous>  levoFLOXacin  Tablet 500 milliGRAM(s) Oral every 24 hours  metroNIDAZOLE    Tablet 500 milliGRAM(s) Oral every 8 hours  oxyCODONE  ER Tablet 60 milliGRAM(s) Oral every 12 hours    Allergies  penicillins (Angioedema)    FAMILY HISTORY:  No pertinent family history in first degree relatives    Vital Signs Last 24 Hrs  T(C): 36.6 (28 Sep 2018 08:59), Max: 37.2 (27 Sep 2018 20:40)  T(F): 97.9 (28 Sep 2018 08:59), Max: 99 (27 Sep 2018 20:40)  HR: 68 (28 Sep 2018 08:59) (68 - 90)  BP: 116/74 (28 Sep 2018 08:59) (98/57 - 135/77)  BP(mean): --  RR: 17 (28 Sep 2018 08:59) (15 - 17)  SpO2: 97% (28 Sep 2018 08:59) (97% - 100%)    PHYSICAL EXAM  General: cachetic, jaundiced   HEENT: NCAT  Resp: CTA BL   Cardiovascular: RRR  Gastrointestinal: soft, NT/ND, no obvious masses / hernia   Extremities: WWP  Neurological: no focal deficits    LABS:                        11.6   6.8   )-----------( 181      ( 28 Sep 2018 06:19 )             34.8     09-28    138  |  102  |  13  ----------------------------<  151<H>  4.3   |  23  |  0.90    Ca    9.5      28 Sep 2018 06:19  Phos  3.2     09-28  Mg     1.9     09-28    TPro  6.1  /  Alb  3.7  /  TBili  11.1<H>  /  DBili  8.4<H>  /  AST  163<H>  /  ALT  192<H>  /  AlkPhos  909<H>  09-28      RADIOLOGY & ADDITIONAL STUDIES: Outside ct abd / pelvis w/ pancreatic mass   CT scan chest performed to rule out metastasis, pending read

## 2018-09-28 NOTE — CONSULT NOTE ADULT - ASSESSMENT
57 year old man with past medical history of HIV (CD4 195, viral load undetectable), GERD, osteoarthiritis, and bilateral sciatic, presents w/ new onset pancreatic mass   - s/p ERCP w/ GI, tissue biopsy pending   - f/u CT chest official real   - check CEA, CA 19-9 57 year old man with past medical history of HIV (CD4 195, viral load undetectable), GERD, osteoarthiritis, and bilateral sciatic, presents w/ new onset pancreatic mass   - s/p ERCP w/ GI, ampullary biopsy performed, plan to attempt ERCP again Monday for pancreatic stent placement   - CT chest with no evidence of pulmonary metastasis   - check CEA, CA 19-9   - MRCP for further definition of biliary tree 57 year old man with past medical history of HIV (CD4 195, viral load undetectable), GERD, osteoarthiritis, and bilateral sciatic, presents w/ new onset pancreatic mass   - s/p ERCP w/ GI, ampullary biopsy performed, plan to attempt ERCP again Monday for pancreatic stent placement   - CT chest with no evidence of pulmonary metastasis   - check CEA, CA 19-9   - Recommend CT triple phase pancreatic protocol   - Would check full hepatitis panel   - d/w Dr. Hope 57 year old man with past medical history of HIV (CD4 195, viral load undetectable), GERD, osteoarthiritis, and bilateral sciatic, presents w/ new onset pancreatic mass   - s/p ERCP w/ GI, ampullary biopsy performed, plan to attempt ERCP again Monday for pancreatic stent placement   - CT chest with no evidence of pulmonary metastasis   - check CEA, CA 19-9   - Recommend MRI w/ contrast to further evaluate biliary mass   - Would check full hepatitis panel   - d/w Dr. Hope

## 2018-09-28 NOTE — DIETITIAN INITIAL EVALUATION ADULT. - ENERGY NEEDS
Ht: 180.3cm, Wt: 50.4kg (9/27), IBW: 78.2kg, 64.5%IBW.   Calculations done using IBW as pt's current wt is <80% IBW. Needs calculated based on [facility] standards of care. Increased kcal and protein provided to pt to promote wt gain and combat wasting.

## 2018-09-28 NOTE — DIETITIAN INITIAL EVALUATION ADULT. - PHYSICAL APPEARANCE
BMI 17.4kg/m2 (based on reported UBW of 56.8kg), NFPE performed, findings: clavicle, temporal, tricep w/ severe wasting, buccal, calves w/ moderate wasting

## 2018-09-28 NOTE — PROGRESS NOTE ADULT - PROBLEM SELECTOR PLAN 1
Patient had a CT scan today due to his abnormal labs and complaints w/ results reporting a 3cm x 2cm x 2cm mass on his pancreas w/ associated jaundice and abdominal cramping  GI consulted- for ERCP tomorrow  - NPO after midnight, clears today, Plan for ERCP +/- biliary stent placement today  - f/u CA 19-9, CEA   - trend LFTs   - f/u CT chest for staging   - patient with poor PO intake IV  cc/hr  - GI following, appreciate recs Patient had a CT scan today due to his abnormal labs and complaints w/ results reporting a 3cm x 2cm x 2cm mass on his pancreas w/ associated jaundice and abdominal cramping  GI consulted- for ERCP tomorrow  - NPO after midnight, clears today, Plan for ERCP +/- biliary stent placement today  - f/u CA 19-9, CEA   - trend LFTs   - f/u CT chest for staging   - patient with poor PO intake IV  cc/hr  - GI following, appreciate recs  - Consider Gen surgery consult once CT scan/ERCP done Patient had a CT scan today due to his abnormal labs and complaints w/ results reporting a 3cm x 2cm x 2cm mass on his pancreas w/ associated jaundice and abdominal cramping  - GI consulted  - NPO after midnight, clears today, Plan for ERCP +/- biliary stent placement today  - f/u CA 19-9, CEA   - trend LFTs   - f/u CT chest for staging   - patient with poor PO intake IV  cc/hr  - GI following, appreciate recs  - Consider Gen surgery consult once CT scan/ERCP done

## 2018-09-28 NOTE — DIETITIAN INITIAL EVALUATION ADULT. - SIGNS/SYMPTOMS
Freeman sent to pharmacy. AEB clavicle, temporal, tricep w/ severe wasting, <75% nutrition needs >1month

## 2018-09-29 LAB
4/8 RATIO: 0.61 RATIO — LOW (ref 0.9–3.6)
ABS CD8: 337 /UL — SIGNIFICANT CHANGE UP (ref 142–740)
ALBUMIN SERPL ELPH-MCNC: 3.3 G/DL — SIGNIFICANT CHANGE UP (ref 3.3–5)
ALP SERPL-CCNC: 686 U/L — HIGH (ref 40–120)
ALT FLD-CCNC: 133 U/L — HIGH (ref 10–45)
ANION GAP SERPL CALC-SCNC: 9 MMOL/L — SIGNIFICANT CHANGE UP (ref 5–17)
AST SERPL-CCNC: 78 U/L — HIGH (ref 10–40)
BILIRUB SERPL-MCNC: 9.3 MG/DL — HIGH (ref 0.2–1.2)
BUN SERPL-MCNC: 12 MG/DL — SIGNIFICANT CHANGE UP (ref 7–23)
CALCIUM SERPL-MCNC: 8.9 MG/DL — SIGNIFICANT CHANGE UP (ref 8.4–10.5)
CANCER AG19-9 SERPL-ACNC: 196.3 U/ML — HIGH
CD16+CD56+ CELLS NFR BLD: 8 % — SIGNIFICANT CHANGE UP (ref 5–23)
CD16+CD56+ CELLS NFR SPEC: 92 /UL — SIGNIFICANT CHANGE UP (ref 71–410)
CD19 BLASTS SPEC-ACNC: 37 % — HIGH (ref 6–24)
CD19 BLASTS SPEC-ACNC: 433 /UL — SIGNIFICANT CHANGE UP (ref 84–469)
CD3 BLASTS SPEC-ACNC: 46 % — LOW (ref 59–83)
CD3 BLASTS SPEC-ACNC: 532 /UL — LOW (ref 672–1870)
CD4 %: 18 % — LOW (ref 30–62)
CD8 %: 29 % — SIGNIFICANT CHANGE UP (ref 12–36)
CHLORIDE SERPL-SCNC: 100 MMOL/L — SIGNIFICANT CHANGE UP (ref 96–108)
CO2 SERPL-SCNC: 27 MMOL/L — SIGNIFICANT CHANGE UP (ref 22–31)
CREAT SERPL-MCNC: 1.06 MG/DL — SIGNIFICANT CHANGE UP (ref 0.5–1.3)
GLUCOSE SERPL-MCNC: 135 MG/DL — HIGH (ref 70–99)
HCT VFR BLD CALC: 30.3 % — LOW (ref 39–50)
HGB BLD-MCNC: 10.3 G/DL — LOW (ref 13–17)
MAGNESIUM SERPL-MCNC: 1.7 MG/DL — SIGNIFICANT CHANGE UP (ref 1.6–2.6)
MCHC RBC-ENTMCNC: 32.1 PG — SIGNIFICANT CHANGE UP (ref 27–34)
MCHC RBC-ENTMCNC: 34 G/DL — SIGNIFICANT CHANGE UP (ref 32–36)
MCV RBC AUTO: 94.4 FL — SIGNIFICANT CHANGE UP (ref 80–100)
PLATELET # BLD AUTO: 138 K/UL — LOW (ref 150–400)
POTASSIUM SERPL-MCNC: 3.8 MMOL/L — SIGNIFICANT CHANGE UP (ref 3.5–5.3)
POTASSIUM SERPL-SCNC: 3.8 MMOL/L — SIGNIFICANT CHANGE UP (ref 3.5–5.3)
PROT SERPL-MCNC: 5.5 G/DL — LOW (ref 6–8.3)
RBC # BLD: 3.21 M/UL — LOW (ref 4.2–5.8)
RBC # FLD: 14.9 % — SIGNIFICANT CHANGE UP (ref 10.3–16.9)
SODIUM SERPL-SCNC: 136 MMOL/L — SIGNIFICANT CHANGE UP (ref 135–145)
T-CELL CD4 SUBSET PNL BLD: 207 /UL — LOW (ref 489–1457)
WBC # BLD: 4.4 K/UL — SIGNIFICANT CHANGE UP (ref 3.8–10.5)
WBC # FLD AUTO: 4.4 K/UL — SIGNIFICANT CHANGE UP (ref 3.8–10.5)

## 2018-09-29 PROCEDURE — 99233 SBSQ HOSP IP/OBS HIGH 50: CPT

## 2018-09-29 RX ORDER — POTASSIUM CHLORIDE 20 MEQ
40 PACKET (EA) ORAL ONCE
Qty: 0 | Refills: 0 | Status: COMPLETED | OUTPATIENT
Start: 2018-09-29 | End: 2018-09-29

## 2018-09-29 RX ORDER — MAGNESIUM SULFATE 500 MG/ML
2 VIAL (ML) INJECTION ONCE
Qty: 0 | Refills: 0 | Status: COMPLETED | OUTPATIENT
Start: 2018-09-29 | End: 2018-09-29

## 2018-09-29 RX ADMIN — Medication 40 MILLIEQUIVALENT(S): at 12:04

## 2018-09-29 RX ADMIN — OXYCODONE HYDROCHLORIDE 60 MILLIGRAM(S): 5 TABLET ORAL at 18:22

## 2018-09-29 RX ADMIN — HYDROMORPHONE HYDROCHLORIDE 8 MILLIGRAM(S): 2 INJECTION INTRAMUSCULAR; INTRAVENOUS; SUBCUTANEOUS at 00:00

## 2018-09-29 RX ADMIN — Medication 500 MILLIGRAM(S): at 16:28

## 2018-09-29 RX ADMIN — SODIUM CHLORIDE 125 MILLILITER(S): 9 INJECTION, SOLUTION INTRAVENOUS at 16:30

## 2018-09-29 RX ADMIN — HEPARIN SODIUM 5000 UNIT(S): 5000 INJECTION INTRAVENOUS; SUBCUTANEOUS at 06:44

## 2018-09-29 RX ADMIN — Medication 1 MILLIGRAM(S): at 21:38

## 2018-09-29 RX ADMIN — EMTRICITABINE AND TENOFOVIR DISOPROXIL FUMARATE 1 TABLET(S): 200; 300 TABLET, FILM COATED ORAL at 21:38

## 2018-09-29 RX ADMIN — BUPROPION HYDROCHLORIDE 150 MILLIGRAM(S): 150 TABLET, EXTENDED RELEASE ORAL at 21:38

## 2018-09-29 RX ADMIN — HYDROMORPHONE HYDROCHLORIDE 8 MILLIGRAM(S): 2 INJECTION INTRAMUSCULAR; INTRAVENOUS; SUBCUTANEOUS at 16:34

## 2018-09-29 RX ADMIN — DARUNAVIR ETHANOLATE AND COBICISTAT 1 TABLET(S): 800; 150 TABLET, FILM COATED ORAL at 21:38

## 2018-09-29 RX ADMIN — Medication 50 GRAM(S): at 09:23

## 2018-09-29 RX ADMIN — Medication 500 MILLIGRAM(S): at 21:38

## 2018-09-29 RX ADMIN — HEPARIN SODIUM 5000 UNIT(S): 5000 INJECTION INTRAVENOUS; SUBCUTANEOUS at 21:38

## 2018-09-29 RX ADMIN — OXYCODONE HYDROCHLORIDE 60 MILLIGRAM(S): 5 TABLET ORAL at 06:44

## 2018-09-29 RX ADMIN — HYDROMORPHONE HYDROCHLORIDE 8 MILLIGRAM(S): 2 INJECTION INTRAMUSCULAR; INTRAVENOUS; SUBCUTANEOUS at 18:09

## 2018-09-29 RX ADMIN — HEPARIN SODIUM 5000 UNIT(S): 5000 INJECTION INTRAVENOUS; SUBCUTANEOUS at 16:28

## 2018-09-29 RX ADMIN — HYDROMORPHONE HYDROCHLORIDE 8 MILLIGRAM(S): 2 INJECTION INTRAMUSCULAR; INTRAVENOUS; SUBCUTANEOUS at 05:17

## 2018-09-29 RX ADMIN — OXYCODONE HYDROCHLORIDE 60 MILLIGRAM(S): 5 TABLET ORAL at 07:26

## 2018-09-29 RX ADMIN — HYDROMORPHONE HYDROCHLORIDE 8 MILLIGRAM(S): 2 INJECTION INTRAMUSCULAR; INTRAVENOUS; SUBCUTANEOUS at 06:00

## 2018-09-29 RX ADMIN — SODIUM CHLORIDE 125 MILLILITER(S): 9 INJECTION, SOLUTION INTRAVENOUS at 07:26

## 2018-09-29 RX ADMIN — Medication 500 MILLIGRAM(S): at 06:44

## 2018-09-29 NOTE — PROGRESS NOTE ADULT - SUBJECTIVE AND OBJECTIVE BOX
Pt seen and examined at bedside.    PERTINENT REVIEW OF SYSTEMS:  CONSTITUTIONAL: No weakness, fevers or chills  HEENT: No visual changes; No vertigo or throat pain   GASTROINTESTINAL: No abdominal or epigastric pain. No nausea, vomiting, or hematemesis; No diarrhea or constipation. No melena or hematochezia.  NEUROLOGICAL: No numbness or weakness  SKIN: No itching, burning, rashes, or lesions     Allergies    penicillins (Angioedema)    Intolerances      MEDICATIONS:  MEDICATIONS  (STANDING):  ALPRAZolam 1 milliGRAM(s) Oral every 24 hours  buPROPion XL . 150 milliGRAM(s) Oral every 24 hours  darunavir 800 mG/cobicstat 150 mG 1 Tablet(s) Oral every 24 hours  emtricitabine 200 mG/tenofovir alafenamide 25 mG (DESCOVY) Tablet 1 Tablet(s) Oral every 24 hours  heparin  Injectable 5000 Unit(s) SubCutaneous every 8 hours  lactated ringers. 1000 milliLiter(s) (125 mL/Hr) IV Continuous <Continuous>  levoFLOXacin  Tablet 500 milliGRAM(s) Oral every 24 hours  metroNIDAZOLE    Tablet 500 milliGRAM(s) Oral every 8 hours  oxyCODONE  ER Tablet 60 milliGRAM(s) Oral every 12 hours  potassium chloride    Tablet ER 40 milliEquivalent(s) Oral once    MEDICATIONS  (PRN):  HYDROmorphone   Tablet 8 milliGRAM(s) Oral every 6 hours PRN Moderate Pain (4 - 6)    Vital Signs Last 24 Hrs  T(C): 36.6 (29 Sep 2018 05:15), Max: 37.1 (28 Sep 2018 21:40)  T(F): 97.8 (29 Sep 2018 05:15), Max: 98.8 (28 Sep 2018 21:40)  HR: 75 (29 Sep 2018 05:15) (67 - 76)  BP: 118/76 (29 Sep 2018 05:15) (103/53 - 118/76)  BP(mean): --  RR: 15 (29 Sep 2018 05:15) (15 - 17)  SpO2: 98% (29 Sep 2018 05:15) (97% - 98%)    09-28 @ 07:01  -  09-29 @ 07:00  --------------------------------------------------------  IN: 1500 mL / OUT: 0 mL / NET: 1500 mL      PHYSICAL EXAM:    General: Well developed; well nourished; in no acute distress  HEENT: MMM, conjunctiva and sclera clear  Gastrointestinal: Soft non-tender non-distended; Normal bowel sounds; No hepatosplenomegaly. No rebound or guarding  Skin: Warm and dry. No obvious rash    LABS:                        10.3   4.4   )-----------( 138      ( 29 Sep 2018 06:00 )             30.3     09-29    136  |  100  |  12  ----------------------------<  135<H>  3.8   |  27  |  1.06    Ca    8.9      29 Sep 2018 06:00  Phos  3.2     09-28  Mg     1.7     09-29    TPro  5.5<L>  /  Alb  3.3  /  TBili  9.3<H>  /  DBili  x   /  AST  78<H>  /  ALT  133<H>  /  AlkPhos  686<H>  09-29                      RADIOLOGY & ADDITIONAL STUDIES: Pt seen and examined at bedside.    PERTINENT REVIEW OF SYSTEMS:  CONSTITUTIONAL: No weakness, fevers or chills  HEENT: No visual changes; No vertigo or throat pain   GASTROINTESTINAL: No nausea, vomiting, or hematemesis; No diarrhea or constipation. No melena or hematochezia.  NEUROLOGICAL: No numbness or weakness  SKIN: No itching, burning, rashes, or lesions     Allergies    penicillins (Angioedema)    Intolerances      MEDICATIONS:  MEDICATIONS  (STANDING):  ALPRAZolam 1 milliGRAM(s) Oral every 24 hours  buPROPion XL . 150 milliGRAM(s) Oral every 24 hours  darunavir 800 mG/cobicstat 150 mG 1 Tablet(s) Oral every 24 hours  emtricitabine 200 mG/tenofovir alafenamide 25 mG (DESCOVY) Tablet 1 Tablet(s) Oral every 24 hours  heparin  Injectable 5000 Unit(s) SubCutaneous every 8 hours  lactated ringers. 1000 milliLiter(s) (125 mL/Hr) IV Continuous <Continuous>  levoFLOXacin  Tablet 500 milliGRAM(s) Oral every 24 hours  metroNIDAZOLE    Tablet 500 milliGRAM(s) Oral every 8 hours  oxyCODONE  ER Tablet 60 milliGRAM(s) Oral every 12 hours  potassium chloride    Tablet ER 40 milliEquivalent(s) Oral once    MEDICATIONS  (PRN):  HYDROmorphone   Tablet 8 milliGRAM(s) Oral every 6 hours PRN Moderate Pain (4 - 6)    Vital Signs Last 24 Hrs  T(C): 36.6 (29 Sep 2018 05:15), Max: 37.1 (28 Sep 2018 21:40)  T(F): 97.8 (29 Sep 2018 05:15), Max: 98.8 (28 Sep 2018 21:40)  HR: 75 (29 Sep 2018 05:15) (67 - 76)  BP: 118/76 (29 Sep 2018 05:15) (103/53 - 118/76)  BP(mean): --  RR: 15 (29 Sep 2018 05:15) (15 - 17)  SpO2: 98% (29 Sep 2018 05:15) (97% - 98%)    09-28 @ 07:01  -  09-29 @ 07:00  --------------------------------------------------------  IN: 1500 mL / OUT: 0 mL / NET: 1500 mL      PHYSICAL EXAM:    General: no acute distress  HEENT: sclera icteric  Gastrointestinal: Soft mild epigastric tenderness non-distended; Normal bowel sounds; No hepatosplenomegaly. No rebound or guarding  Skin: Warm and dry,icteric    LABS:                        10.3   4.4   )-----------( 138      ( 29 Sep 2018 06:00 )             30.3     09-29    136  |  100  |  12  ----------------------------<  135<H>  3.8   |  27  |  1.06    Ca    8.9      29 Sep 2018 06:00  Phos  3.2     09-28  Mg     1.7     09-29    TPro  5.5<L>  /  Alb  3.3  /  TBili  9.3<H>  /  DBili  x   /  AST  78<H>  /  ALT  133<H>  /  AlkPhos  686<H>  09-29                      RADIOLOGY & ADDITIONAL STUDIES:

## 2018-09-29 NOTE — PROGRESS NOTE ADULT - SUBJECTIVE AND OBJECTIVE BOX
Patient is a 57y old  Male who presents with a chief complaint of jaundice (28 Sep 2018 15:36)      24 hour events:     ROS:    Vital Signs Last 24 Hrs  T(C): 36.6 (29 Sep 2018 05:15), Max: 37.1 (28 Sep 2018 21:40)  T(F): 97.8 (29 Sep 2018 05:15), Max: 98.8 (28 Sep 2018 21:40)  HR: 75 (29 Sep 2018 05:15) (67 - 76)  BP: 118/76 (29 Sep 2018 05:15) (103/53 - 118/76)  BP(mean): --  RR: 15 (29 Sep 2018 05:15) (15 - 17)  SpO2: 98% (29 Sep 2018 05:15) (97% - 98%)  I&O's Summary    28 Sep 2018 07:01  -  29 Sep 2018 07:00  --------------------------------------------------------  IN: 1500 mL / OUT: 0 mL / NET: 1500 mL      CAPILLARY BLOOD GLUCOSE        PHYSICAL EXAM:      Constitutional:    Eyes:    ENMT:    Neck:    Breasts:    Back:    Respiratory:    Cardiovascular:    Gastrointestinal:    Genitourinary:    Rectal:    Extremities:    Vascular:    Neurological:    Skin:    Lymph Nodes:    Musculoskeletal:    Psychiatric:                              10.3   4.4   )-----------( 138      ( 29 Sep 2018 06:00 )             30.3     09-29    136  |  100  |  12  ----------------------------<  135<H>  3.8   |  27  |  1.06    Ca    8.9      29 Sep 2018 06:00  Phos  3.2     09-28  Mg     1.7     09-29    TPro  5.5<L>  /  Alb  3.3  /  TBili  9.3<H>  /  DBili  x   /  AST  78<H>  /  ALT  133<H>  /  AlkPhos  686<H>  09-29    Imaging:     MEDICATIONS  (STANDING):  ALPRAZolam 1 milliGRAM(s) Oral every 24 hours  buPROPion XL . 150 milliGRAM(s) Oral every 24 hours  darunavir 800 mG/cobicstat 150 mG 1 Tablet(s) Oral every 24 hours  emtricitabine 200 mG/tenofovir alafenamide 25 mG (DESCOVY) Tablet 1 Tablet(s) Oral every 24 hours  heparin  Injectable 5000 Unit(s) SubCutaneous every 8 hours  lactated ringers. 1000 milliLiter(s) (125 mL/Hr) IV Continuous <Continuous>  levoFLOXacin  Tablet 500 milliGRAM(s) Oral every 24 hours  metroNIDAZOLE    Tablet 500 milliGRAM(s) Oral every 8 hours  oxyCODONE  ER Tablet 60 milliGRAM(s) Oral every 12 hours  potassium chloride    Tablet ER 40 milliEquivalent(s) Oral once    MEDICATIONS  (PRN):  HYDROmorphone   Tablet 8 milliGRAM(s) Oral every 6 hours PRN Moderate Pain (4 - 6)      This is a 57y Male with a history of Bilateral sciatica  Arthritis  Gastric reflux  HIV (human immunodeficiency virus infection)   admitted for MED EVAL  .  HEALTH ISSUES - PROBLEM Dx:  Prophylactic measure: Prophylactic measure - SQ  Depression: Depression - stable on current regimen  HIV (human immunodeficiency virus infection): HIV (human immunodeficiency virus infection) - CD4 207, VL undetected, continue HAART (ensure dose adjusted)  Pancreatic mass: Pancreatic mass - awaiting repeat ERCP for CBD stenting, f/u surgery and GI recs re: eval of mass  Abx Patient is a 57y old  Male who presents with a chief complaint of jaundice (28 Sep 2018 15:36)      24 hour events: Unable to place CBD stent during ERCP    ROS: Occ fleeting epigastric pain, otherwise all sys neg    Vital Signs Last 24 Hrs  T(C): 36.6 (29 Sep 2018 05:15), Max: 37.1 (28 Sep 2018 21:40)  T(F): 97.8 (29 Sep 2018 05:15), Max: 98.8 (28 Sep 2018 21:40)  HR: 75 (29 Sep 2018 05:15) (67 - 76)  BP: 118/76 (29 Sep 2018 05:15) (103/53 - 118/76)  BP(mean): --  RR: 15 (29 Sep 2018 05:15) (15 - 17)  SpO2: 98% (29 Sep 2018 05:15) (97% - 98%)  I&O's Summary    28 Sep 2018 07:01  -  29 Sep 2018 07:00  --------------------------------------------------------  IN: 1500 mL / OUT: 0 mL / NET: 1500 mL      CAPILLARY BLOOD GLUCOSE        PHYSICAL EXAM:      Constitutional: NAD, jaundice    Respiratory: CTAB    Cardiovascular: RRR    Gastrointestinal: TTP in epigastrum                          10.3   4.4   )-----------( 138      ( 29 Sep 2018 06:00 )             30.3     09-29    136  |  100  |  12  ----------------------------<  135<H>  3.8   |  27  |  1.06    Ca    8.9      29 Sep 2018 06:00  Phos  3.2     09-28  Mg     1.7     09-29    TPro  5.5<L>  /  Alb  3.3  /  TBili  9.3<H>  /  DBili  x   /  AST  78<H>  /  ALT  133<H>  /  AlkPhos  686<H>  09-29    Imaging:     MEDICATIONS  (STANDING):  ALPRAZolam 1 milliGRAM(s) Oral every 24 hours  buPROPion XL . 150 milliGRAM(s) Oral every 24 hours  darunavir 800 mG/cobicstat 150 mG 1 Tablet(s) Oral every 24 hours  emtricitabine 200 mG/tenofovir alafenamide 25 mG (DESCOVY) Tablet 1 Tablet(s) Oral every 24 hours  heparin  Injectable 5000 Unit(s) SubCutaneous every 8 hours  lactated ringers. 1000 milliLiter(s) (125 mL/Hr) IV Continuous <Continuous>  levoFLOXacin  Tablet 500 milliGRAM(s) Oral every 24 hours  metroNIDAZOLE    Tablet 500 milliGRAM(s) Oral every 8 hours  oxyCODONE  ER Tablet 60 milliGRAM(s) Oral every 12 hours  potassium chloride    Tablet ER 40 milliEquivalent(s) Oral once    MEDICATIONS  (PRN):  HYDROmorphone   Tablet 8 milliGRAM(s) Oral every 6 hours PRN Moderate Pain (4 - 6)      This is a 57y Male with a history of Bilateral sciatica  Arthritis  Gastric reflux  HIV (human immunodeficiency virus infection)   admitted for MED EVAL  .  HEALTH ISSUES - PROBLEM Dx:  Prophylactic measure: Prophylactic measure - SQ  Depression: Depression - stable on current regimen  HIV (human immunodeficiency virus infection): HIV (human immunodeficiency virus infection) - CD4 207, VL undetected, continue HAART (ensure dose adjusted)  Pancreatic mass: Pancreatic mass - awaiting repeat ERCP for CBD stenting (on abx ppx until that time), f/u surgery and GI recs re: eval of mass

## 2018-09-29 NOTE — PROGRESS NOTE ADULT - PROBLEM SELECTOR PLAN 1
Patient had a CT scan due to his abnormal labs and complaints w/ results reporting a 3cm x 2cm x 2cm mass on his pancreas w/ associated jaundice and abdominal cramping  - GI consulted  - Attempted ERCP, unable to stent, did sphincterotomy and will reassess on monday with repeat ERCP  - clears today  - f/u CA 19-9, CEA   - trend LFTs   - CT chest - for metastasis  - GI following, appreciate recs  - Consider Gen surgery consult once CT scan/ERCP done

## 2018-09-29 NOTE — PROGRESS NOTE ADULT - ASSESSMENT
58 YO M h/o HIV (CD4 195, VL UD), nutcracker esophagus, OA, B/L sciatica referred by his PMD for abnormal LFTs and CT scan.     # Abnormal LFTs 2/2 biliary obstruction 2/2 pancreatic mass vs ampullary mass/stricture vs cholangiocarcinoma  - Outside CT scan demonstrated 3cm x 2cm x 2cm mass on his pancreas.   -Patient underwent ERCP 9/28, however unable to pass scope through the ampulla ,s/p  biopsy of ampula  - LFT's  trending down, no fever, wbc count normal   - CT chest shows no e/o metastasis  - CEA and CA 19-9 pending  - Plan for repeat ERCP +/- biliary stent placement monday  - NPO after midnight sunday  - Monitor LFTs daily  - Pain control per primary team 58 YO M h/o HIV (CD4 195, VL UD), nutcracker esophagus, OA, B/L sciatica referred by his PMD for abnormal LFTs and CT scan.     # Abnormal LFTs 2/2 biliary obstruction 2/2 pancreatic mass vs ampullary mass/stricture vs cholangiocarcinoma  - Outside CT scan demonstrated 3cm x 2cm x 2cm mass on his pancreas.   -Patient underwent ERCP 9/28, however unable to pass scope through the ampulla ,s/p  biopsy of ampula  - LFT's  trending down, no fever, wbc count normal   - CT chest shows no e/o metastasis  - CEA and CA 19-9 pending  - Plan for repeat ERCP +/- biliary stent placement Monday  - NPO after midnight Sunday  - Monitor LFTs daily  - Pain control per primary team  -continue clears for today as patient has some abdominal discomfort, can advance to full liquid diet later in the day if pain improves    Discussed with Dr Ricks

## 2018-09-29 NOTE — PROGRESS NOTE ADULT - SUBJECTIVE AND OBJECTIVE BOX
OVERNIGHT EVENTS:  bob    SUBJECTIVE / INTERVAL HPI: Patient seen and examined at bedside.   Patient laying comfortably in bed, awake and alert with no active complaints overnight.   Patient denies h/n/v/d, fever, chills, cp, palpitations, sob, abd pain, leg swelling, rashes, dysuria, and changes in BM. Still on liquid clears diet.      VITAL SIGNS:  Vital Signs Last 24 Hrs  T(C): 37.1 (29 Sep 2018 21:00), Max: 37.2 (29 Sep 2018 12:09)  T(F): 98.8 (29 Sep 2018 21:00), Max: 99 (29 Sep 2018 12:09)  HR: 69 (29 Sep 2018 21:00) (69 - 76)  BP: 109/68 (29 Sep 2018 21:00) (98/58 - 118/76)  BP(mean): --  RR: 16 (29 Sep 2018 21:00) (15 - 16)  SpO2: 95% (29 Sep 2018 12:09) (95% - 98%)    PHYSICAL EXAM:    General: WDWN, cachetic, yellow appearing  HEENT: NC/AT; PERRL, icteric sclera; MMM  Neck: supple  Cardiovascular: +S1/S2; RRR  Respiratory: CTA B/L; no W/R/R  Gastrointestinal: soft, NT/ND; +BSx4, no masses  Extremities: WWP; no edema, clubbing or cyanosis, muscle wasting evident  Vascular: 2+ radial, DP/PT pulses B/L  Neurological: AAOx3; no focal deficits    MEDICATIONS:  MEDICATIONS  (STANDING):  ALPRAZolam 1 milliGRAM(s) Oral every 24 hours  buPROPion XL . 150 milliGRAM(s) Oral every 24 hours  darunavir 800 mG/cobicstat 150 mG 1 Tablet(s) Oral every 24 hours  emtricitabine 200 mG/tenofovir alafenamide 25 mG (DESCOVY) Tablet 1 Tablet(s) Oral every 24 hours  heparin  Injectable 5000 Unit(s) SubCutaneous every 8 hours  lactated ringers. 1000 milliLiter(s) (125 mL/Hr) IV Continuous <Continuous>  levoFLOXacin  Tablet 500 milliGRAM(s) Oral every 24 hours  metroNIDAZOLE    Tablet 500 milliGRAM(s) Oral every 8 hours  oxyCODONE  ER Tablet 60 milliGRAM(s) Oral every 12 hours    MEDICATIONS  (PRN):  HYDROmorphone   Tablet 8 milliGRAM(s) Oral every 6 hours PRN Moderate Pain (4 - 6)      ALLERGIES:  Allergies    penicillins (Angioedema)    Intolerances        LABS:                        10.3   4.4   )-----------( 138      ( 29 Sep 2018 06:00 )             30.3     09-29    136  |  100  |  12  ----------------------------<  135<H>  3.8   |  27  |  1.06    Ca    8.9      29 Sep 2018 06:00  Phos  3.2     09-28  Mg     1.7     09-29    TPro  5.5<L>  /  Alb  3.3  /  TBili  9.3<H>  /  DBili  x   /  AST  78<H>  /  ALT  133<H>  /  AlkPhos  686<H>  09-29        CAPILLARY BLOOD GLUCOSE          RADIOLOGY & ADDITIONAL TESTS: Reviewed.

## 2018-09-30 LAB
ALBUMIN SERPL ELPH-MCNC: 3.1 G/DL — LOW (ref 3.3–5)
ALP SERPL-CCNC: 551 U/L — HIGH (ref 40–120)
ALT FLD-CCNC: 89 U/L — HIGH (ref 10–45)
ANION GAP SERPL CALC-SCNC: 5 MMOL/L — SIGNIFICANT CHANGE UP (ref 5–17)
AST SERPL-CCNC: 38 U/L — SIGNIFICANT CHANGE UP (ref 10–40)
BILIRUB SERPL-MCNC: 7.1 MG/DL — HIGH (ref 0.2–1.2)
BUN SERPL-MCNC: 7 MG/DL — SIGNIFICANT CHANGE UP (ref 7–23)
CALCIUM SERPL-MCNC: 8.6 MG/DL — SIGNIFICANT CHANGE UP (ref 8.4–10.5)
CHLORIDE SERPL-SCNC: 100 MMOL/L — SIGNIFICANT CHANGE UP (ref 96–108)
CO2 SERPL-SCNC: 31 MMOL/L — SIGNIFICANT CHANGE UP (ref 22–31)
CREAT SERPL-MCNC: 1.06 MG/DL — SIGNIFICANT CHANGE UP (ref 0.5–1.3)
GLUCOSE BLDC GLUCOMTR-MCNC: 178 MG/DL — HIGH (ref 70–99)
GLUCOSE BLDC GLUCOMTR-MCNC: 221 MG/DL — HIGH (ref 70–99)
GLUCOSE SERPL-MCNC: 253 MG/DL — HIGH (ref 70–99)
HBA1C BLD-MCNC: 5 % — SIGNIFICANT CHANGE UP (ref 4–5.6)
HCT VFR BLD CALC: 28.5 % — LOW (ref 39–50)
HGB BLD-MCNC: 9.4 G/DL — LOW (ref 13–17)
MAGNESIUM SERPL-MCNC: 1.8 MG/DL — SIGNIFICANT CHANGE UP (ref 1.6–2.6)
MCHC RBC-ENTMCNC: 31.4 PG — SIGNIFICANT CHANGE UP (ref 27–34)
MCHC RBC-ENTMCNC: 33 G/DL — SIGNIFICANT CHANGE UP (ref 32–36)
MCV RBC AUTO: 95.3 FL — SIGNIFICANT CHANGE UP (ref 80–100)
PHOSPHATE SERPL-MCNC: 1.8 MG/DL — LOW (ref 2.5–4.5)
PLATELET # BLD AUTO: 129 K/UL — LOW (ref 150–400)
POTASSIUM SERPL-MCNC: 4.1 MMOL/L — SIGNIFICANT CHANGE UP (ref 3.5–5.3)
POTASSIUM SERPL-SCNC: 4.1 MMOL/L — SIGNIFICANT CHANGE UP (ref 3.5–5.3)
PROT SERPL-MCNC: 5.1 G/DL — LOW (ref 6–8.3)
RBC # BLD: 2.99 M/UL — LOW (ref 4.2–5.8)
RBC # FLD: 14.9 % — SIGNIFICANT CHANGE UP (ref 10.3–16.9)
SODIUM SERPL-SCNC: 136 MMOL/L — SIGNIFICANT CHANGE UP (ref 135–145)
WBC # BLD: 4 K/UL — SIGNIFICANT CHANGE UP (ref 3.8–10.5)
WBC # FLD AUTO: 4 K/UL — SIGNIFICANT CHANGE UP (ref 3.8–10.5)

## 2018-09-30 PROCEDURE — 99233 SBSQ HOSP IP/OBS HIGH 50: CPT

## 2018-09-30 RX ORDER — INSULIN LISPRO 100/ML
VIAL (ML) SUBCUTANEOUS
Qty: 0 | Refills: 0 | Status: DISCONTINUED | OUTPATIENT
Start: 2018-09-30 | End: 2018-10-06

## 2018-09-30 RX ORDER — MAGNESIUM SULFATE 500 MG/ML
1 VIAL (ML) INJECTION ONCE
Qty: 0 | Refills: 0 | Status: COMPLETED | OUTPATIENT
Start: 2018-09-30 | End: 2018-09-30

## 2018-09-30 RX ORDER — MAGNESIUM SULFATE 500 MG/ML
1 VIAL (ML) INJECTION ONCE
Qty: 0 | Refills: 0 | Status: DISCONTINUED | OUTPATIENT
Start: 2018-09-30 | End: 2018-09-30

## 2018-09-30 RX ORDER — DEXTROSE 50 % IN WATER 50 %
12.5 SYRINGE (ML) INTRAVENOUS ONCE
Qty: 0 | Refills: 0 | Status: DISCONTINUED | OUTPATIENT
Start: 2018-09-30 | End: 2018-10-06

## 2018-09-30 RX ORDER — DEXTROSE 50 % IN WATER 50 %
15 SYRINGE (ML) INTRAVENOUS ONCE
Qty: 0 | Refills: 0 | Status: DISCONTINUED | OUTPATIENT
Start: 2018-09-30 | End: 2018-10-06

## 2018-09-30 RX ORDER — GLUCAGON INJECTION, SOLUTION 0.5 MG/.1ML
1 INJECTION, SOLUTION SUBCUTANEOUS ONCE
Qty: 0 | Refills: 0 | Status: DISCONTINUED | OUTPATIENT
Start: 2018-09-30 | End: 2018-10-06

## 2018-09-30 RX ORDER — DEXTROSE 50 % IN WATER 50 %
25 SYRINGE (ML) INTRAVENOUS ONCE
Qty: 0 | Refills: 0 | Status: DISCONTINUED | OUTPATIENT
Start: 2018-09-30 | End: 2018-10-06

## 2018-09-30 RX ORDER — SODIUM CHLORIDE 9 MG/ML
1000 INJECTION, SOLUTION INTRAVENOUS
Qty: 0 | Refills: 0 | Status: DISCONTINUED | OUTPATIENT
Start: 2018-09-30 | End: 2018-10-06

## 2018-09-30 RX ORDER — SODIUM CHLORIDE 9 MG/ML
1000 INJECTION INTRAMUSCULAR; INTRAVENOUS; SUBCUTANEOUS
Qty: 0 | Refills: 0 | Status: DISCONTINUED | OUTPATIENT
Start: 2018-09-30 | End: 2018-10-04

## 2018-09-30 RX ADMIN — Medication 500 MILLIGRAM(S): at 06:10

## 2018-09-30 RX ADMIN — SODIUM CHLORIDE 125 MILLILITER(S): 9 INJECTION, SOLUTION INTRAVENOUS at 06:10

## 2018-09-30 RX ADMIN — HYDROMORPHONE HYDROCHLORIDE 8 MILLIGRAM(S): 2 INJECTION INTRAMUSCULAR; INTRAVENOUS; SUBCUTANEOUS at 13:25

## 2018-09-30 RX ADMIN — HYDROMORPHONE HYDROCHLORIDE 8 MILLIGRAM(S): 2 INJECTION INTRAMUSCULAR; INTRAVENOUS; SUBCUTANEOUS at 02:20

## 2018-09-30 RX ADMIN — Medication 500 MILLIGRAM(S): at 13:31

## 2018-09-30 RX ADMIN — Medication 62.5 MILLIMOLE(S): at 13:31

## 2018-09-30 RX ADMIN — EMTRICITABINE AND TENOFOVIR DISOPROXIL FUMARATE 1 TABLET(S): 200; 300 TABLET, FILM COATED ORAL at 21:44

## 2018-09-30 RX ADMIN — OXYCODONE HYDROCHLORIDE 60 MILLIGRAM(S): 5 TABLET ORAL at 17:42

## 2018-09-30 RX ADMIN — HYDROMORPHONE HYDROCHLORIDE 8 MILLIGRAM(S): 2 INJECTION INTRAMUSCULAR; INTRAVENOUS; SUBCUTANEOUS at 22:40

## 2018-09-30 RX ADMIN — HYDROMORPHONE HYDROCHLORIDE 8 MILLIGRAM(S): 2 INJECTION INTRAMUSCULAR; INTRAVENOUS; SUBCUTANEOUS at 03:09

## 2018-09-30 RX ADMIN — OXYCODONE HYDROCHLORIDE 60 MILLIGRAM(S): 5 TABLET ORAL at 18:22

## 2018-09-30 RX ADMIN — Medication 2: at 21:44

## 2018-09-30 RX ADMIN — HEPARIN SODIUM 5000 UNIT(S): 5000 INJECTION INTRAVENOUS; SUBCUTANEOUS at 21:44

## 2018-09-30 RX ADMIN — SODIUM CHLORIDE 100 MILLILITER(S): 9 INJECTION INTRAMUSCULAR; INTRAVENOUS; SUBCUTANEOUS at 17:58

## 2018-09-30 RX ADMIN — OXYCODONE HYDROCHLORIDE 60 MILLIGRAM(S): 5 TABLET ORAL at 07:00

## 2018-09-30 RX ADMIN — OXYCODONE HYDROCHLORIDE 60 MILLIGRAM(S): 5 TABLET ORAL at 06:09

## 2018-09-30 RX ADMIN — HEPARIN SODIUM 5000 UNIT(S): 5000 INJECTION INTRAVENOUS; SUBCUTANEOUS at 13:31

## 2018-09-30 RX ADMIN — BUPROPION HYDROCHLORIDE 150 MILLIGRAM(S): 150 TABLET, EXTENDED RELEASE ORAL at 21:44

## 2018-09-30 RX ADMIN — Medication 4: at 17:41

## 2018-09-30 RX ADMIN — HEPARIN SODIUM 5000 UNIT(S): 5000 INJECTION INTRAVENOUS; SUBCUTANEOUS at 06:10

## 2018-09-30 RX ADMIN — HYDROMORPHONE HYDROCHLORIDE 8 MILLIGRAM(S): 2 INJECTION INTRAMUSCULAR; INTRAVENOUS; SUBCUTANEOUS at 21:44

## 2018-09-30 RX ADMIN — Medication 500 MILLIGRAM(S): at 21:44

## 2018-09-30 RX ADMIN — DARUNAVIR ETHANOLATE AND COBICISTAT 1 TABLET(S): 800; 150 TABLET, FILM COATED ORAL at 21:44

## 2018-09-30 RX ADMIN — Medication 1 MILLIGRAM(S): at 21:44

## 2018-09-30 RX ADMIN — Medication 100 GRAM(S): at 12:42

## 2018-09-30 RX ADMIN — SODIUM CHLORIDE 125 MILLILITER(S): 9 INJECTION, SOLUTION INTRAVENOUS at 00:28

## 2018-09-30 RX ADMIN — HYDROMORPHONE HYDROCHLORIDE 8 MILLIGRAM(S): 2 INJECTION INTRAMUSCULAR; INTRAVENOUS; SUBCUTANEOUS at 12:38

## 2018-09-30 NOTE — PROGRESS NOTE ADULT - SUBJECTIVE AND OBJECTIVE BOX
Patient is a 57y old  Male who presents with a chief complaint of jaundice (30 Sep 2018 11:15)      24 hour events: None    ROS: Occ fleeting epigastric pain otherwise all sys neg    Vital Signs Last 24 Hrs  T(C): 37.8 (30 Sep 2018 10:02), Max: 37.8 (30 Sep 2018 10:02)  T(F): 100.1 (30 Sep 2018 10:02), Max: 100.1 (30 Sep 2018 10:02)  HR: 75 (30 Sep 2018 10:02) (69 - 75)  BP: 100/64 (30 Sep 2018 10:02) (98/58 - 109/68)  BP(mean): --  RR: 14 (30 Sep 2018 10:02) (14 - 16)  SpO2: 97% (30 Sep 2018 10:02) (95% - 98%)  I&O's Summary    29 Sep 2018 07:01  -  30 Sep 2018 07:00  --------------------------------------------------------  IN: 1500 mL / OUT: 0 mL / NET: 1500 mL      CAPILLARY BLOOD GLUCOSE        PHYSICAL EXAM:      Constitutional: NAD, jaundice    Respiratory: CTAB    Cardiovascular: RRR    Gastrointestinal: TTP in epigastrum but ND, soft                              9.4    4.0   )-----------( 129      ( 30 Sep 2018 07:11 )             28.5     09-30    136  |  100  |  7   ----------------------------<  253<H>  4.1   |  31  |  1.06    Ca    8.6      30 Sep 2018 07:11  Phos  1.8     09-30  Mg     1.8     09-30    TPro  5.1<L>  /  Alb  3.1<L>  /  TBili  7.1<H>  /  DBili  x   /  AST  38  /  ALT  89<H>  /  AlkPhos  551<H>  09-30    Imaging:     MEDICATIONS  (STANDING):  ALPRAZolam 1 milliGRAM(s) Oral every 24 hours  buPROPion XL . 150 milliGRAM(s) Oral every 24 hours  darunavir 800 mG/cobicstat 150 mG 1 Tablet(s) Oral every 24 hours  emtricitabine 200 mG/tenofovir alafenamide 25 mG (DESCOVY) Tablet 1 Tablet(s) Oral every 24 hours  heparin  Injectable 5000 Unit(s) SubCutaneous every 8 hours  lactated ringers. 1000 milliLiter(s) (125 mL/Hr) IV Continuous <Continuous>  levoFLOXacin  Tablet 500 milliGRAM(s) Oral every 24 hours  metroNIDAZOLE    Tablet 500 milliGRAM(s) Oral every 8 hours  oxyCODONE  ER Tablet 60 milliGRAM(s) Oral every 12 hours    MEDICATIONS  (PRN):  HYDROmorphone   Tablet 8 milliGRAM(s) Oral every 6 hours PRN Moderate Pain (4 - 6)      This is a 57y Male with a history of Bilateral sciatica  Arthritis  Gastric reflux  HIV (human immunodeficiency virus infection)   admitted for MED EVAL  .  HEALTH ISSUES - PROBLEM Dx:  Prophylactic measure: Prophylactic measure - SQH  Depression: Depression - continue home regimen  HIV (human immunodeficiency virus infection): HIV (human immunodeficiency virus infection) - CD4 200s, VL undetectable, continue ART  Pancreatic mass: Pancreatic mass - awaiting re-attempt for CBD stent placement via ERCP tomorrow, continue abx ppx  Elevated LFTs - improving  Hyperglycemia - check A1c and perform med rec and eval of diet to remove offending factors

## 2018-09-30 NOTE — PROGRESS NOTE ADULT - SUBJECTIVE AND OBJECTIVE BOX
Pt seen and examined at bedside.    PERTINENT REVIEW OF SYSTEMS:  CONSTITUTIONAL: No weakness, fevers or chills  HEENT: No visual changes; No vertigo or throat pain   GASTROINTESTINAL: No abdominal or epigastric pain. No nausea, vomiting, or hematemesis; No diarrhea or constipation. No melena or hematochezia.  NEUROLOGICAL: No numbness or weakness  SKIN: No itching, burning, rashes, or lesions     Allergies    penicillins (Angioedema)    Intolerances      MEDICATIONS:  MEDICATIONS  (STANDING):  ALPRAZolam 1 milliGRAM(s) Oral every 24 hours  buPROPion XL . 150 milliGRAM(s) Oral every 24 hours  darunavir 800 mG/cobicstat 150 mG 1 Tablet(s) Oral every 24 hours  emtricitabine 200 mG/tenofovir alafenamide 25 mG (DESCOVY) Tablet 1 Tablet(s) Oral every 24 hours  heparin  Injectable 5000 Unit(s) SubCutaneous every 8 hours  lactated ringers. 1000 milliLiter(s) (125 mL/Hr) IV Continuous <Continuous>  levoFLOXacin  Tablet 500 milliGRAM(s) Oral every 24 hours  metroNIDAZOLE    Tablet 500 milliGRAM(s) Oral every 8 hours  oxyCODONE  ER Tablet 60 milliGRAM(s) Oral every 12 hours    MEDICATIONS  (PRN):  HYDROmorphone   Tablet 8 milliGRAM(s) Oral every 6 hours PRN Moderate Pain (4 - 6)    Vital Signs Last 24 Hrs  T(C): 37.8 (30 Sep 2018 10:02), Max: 37.8 (30 Sep 2018 10:02)  T(F): 100.1 (30 Sep 2018 10:02), Max: 100.1 (30 Sep 2018 10:02)  HR: 75 (30 Sep 2018 10:02) (69 - 75)  BP: 100/64 (30 Sep 2018 10:02) (98/58 - 109/68)  BP(mean): --  RR: 14 (30 Sep 2018 10:02) (14 - 16)  SpO2: 97% (30 Sep 2018 10:02) (95% - 98%)    09-29 @ 07:01  -  09-30 @ 07:00  --------------------------------------------------------  IN: 1500 mL / OUT: 0 mL / NET: 1500 mL      PHYSICAL EXAM:    General: Well developed; well nourished; in no acute distress  HEENT: MMM, conjunctiva and sclera clear  Gastrointestinal: Soft non-tender non-distended; Normal bowel sounds; No hepatosplenomegaly. No rebound or guarding  Skin: Warm and dry. No obvious rash    LABS:                        9.4    4.0   )-----------( 129      ( 30 Sep 2018 07:11 )             28.5     09-30    136  |  100  |  7   ----------------------------<  253<H>  4.1   |  31  |  1.06    Ca    8.6      30 Sep 2018 07:11  Phos  1.8     09-30  Mg     1.8     09-30    TPro  5.1<L>  /  Alb  3.1<L>  /  TBili  7.1<H>  /  DBili  x   /  AST  38  /  ALT  89<H>  /  AlkPhos  551<H>  09-30                      RADIOLOGY & ADDITIONAL STUDIES: Pt seen and examined at bedside.    PERTINENT REVIEW OF SYSTEMS:  CONSTITUTIONAL: No weakness, fevers or chills  HEENT: No visual changes; No vertigo or throat pain   GASTROINTESTINAL: No abdominal or epigastric pain. No nausea, vomiting, or hematemesis; No diarrhea or constipation. No melena or hematochezia.  NEUROLOGICAL: No numbness or weakness  SKIN: No itching, burning, rashes, or lesions     Allergies    penicillins (Angioedema)    Intolerances      MEDICATIONS:  MEDICATIONS  (STANDING):  ALPRAZolam 1 milliGRAM(s) Oral every 24 hours  buPROPion XL . 150 milliGRAM(s) Oral every 24 hours  darunavir 800 mG/cobicstat 150 mG 1 Tablet(s) Oral every 24 hours  emtricitabine 200 mG/tenofovir alafenamide 25 mG (DESCOVY) Tablet 1 Tablet(s) Oral every 24 hours  heparin  Injectable 5000 Unit(s) SubCutaneous every 8 hours  lactated ringers. 1000 milliLiter(s) (125 mL/Hr) IV Continuous <Continuous>  levoFLOXacin  Tablet 500 milliGRAM(s) Oral every 24 hours  metroNIDAZOLE    Tablet 500 milliGRAM(s) Oral every 8 hours  oxyCODONE  ER Tablet 60 milliGRAM(s) Oral every 12 hours    MEDICATIONS  (PRN):  HYDROmorphone   Tablet 8 milliGRAM(s) Oral every 6 hours PRN Moderate Pain (4 - 6)    Vital Signs Last 24 Hrs  T(C): 37.8 (30 Sep 2018 10:02), Max: 37.8 (30 Sep 2018 10:02)  T(F): 100.1 (30 Sep 2018 10:02), Max: 100.1 (30 Sep 2018 10:02)  HR: 75 (30 Sep 2018 10:02) (69 - 75)  BP: 100/64 (30 Sep 2018 10:02) (98/58 - 109/68)  BP(mean): --  RR: 14 (30 Sep 2018 10:02) (14 - 16)  SpO2: 97% (30 Sep 2018 10:02) (95% - 98%)    09-29 @ 07:01  -  09-30 @ 07:00  --------------------------------------------------------  IN: 1500 mL / OUT: 0 mL / NET: 1500 mL      PHYSICAL EXAM:    General: no acute distress  HEENT: MMM, sclera icteric  Gastrointestinal: Soft non-tender non-distended; Normal bowel sounds; No hepatosplenomegaly. No rebound or guarding  Skin: Warm and dry. No obvious rash    LABS:                        9.4    4.0   )-----------( 129      ( 30 Sep 2018 07:11 )             28.5     09-30    136  |  100  |  7   ----------------------------<  253<H>  4.1   |  31  |  1.06    Ca    8.6      30 Sep 2018 07:11  Phos  1.8     09-30  Mg     1.8     09-30    TPro  5.1<L>  /  Alb  3.1<L>  /  TBili  7.1<H>  /  DBili  x   /  AST  38  /  ALT  89<H>  /  AlkPhos  551<H>  09-30                      RADIOLOGY & ADDITIONAL STUDIES:

## 2018-09-30 NOTE — PROGRESS NOTE ADULT - ASSESSMENT
56 YO M h/o HIV (CD4 195, VL UD), nutcracker esophagus, OA, B/L sciatica referred by his PMD for abnormal LFTs and CT scan.     # Abnormal LFTs 2/2 biliary obstruction 2/2 pancreatic mass vs ampullary mass/stricture vs cholangiocarcinoma  - Outside CT scan demonstrated 3cm x 2cm x 2cm mass on his pancreas.   -Patient underwent ERCP 9/28, however unable to pass scope through the ampulla ,s/p  biopsy of ampula  - LFT's  trending down, no fever, wbc count normal   - tolerating full liquid diet   - CT chest shows no e/o metastasis  - CEA normal and CA 19-9 elevated at 196  - Plan for repeat ERCP +/- biliary stent placement Monday  - NPO after midnight Sunday  - Monitor LFTs daily  - Pain control per primary team

## 2018-10-01 LAB
ALBUMIN SERPL ELPH-MCNC: 3.5 G/DL — SIGNIFICANT CHANGE UP (ref 3.3–5)
ALP SERPL-CCNC: 573 U/L — HIGH (ref 40–120)
ALT FLD-CCNC: 88 U/L — HIGH (ref 10–45)
ANION GAP SERPL CALC-SCNC: 11 MMOL/L — SIGNIFICANT CHANGE UP (ref 5–17)
APTT BLD: 33.7 SEC — SIGNIFICANT CHANGE UP (ref 27.5–37.4)
AST SERPL-CCNC: 53 U/L — HIGH (ref 10–40)
BILIRUB SERPL-MCNC: 9.4 MG/DL — HIGH (ref 0.2–1.2)
BLD GP AB SCN SERPL QL: NEGATIVE — SIGNIFICANT CHANGE UP
BUN SERPL-MCNC: 7 MG/DL — SIGNIFICANT CHANGE UP (ref 7–23)
CALCIUM SERPL-MCNC: 9.1 MG/DL — SIGNIFICANT CHANGE UP (ref 8.4–10.5)
CHLORIDE SERPL-SCNC: 100 MMOL/L — SIGNIFICANT CHANGE UP (ref 96–108)
CO2 SERPL-SCNC: 27 MMOL/L — SIGNIFICANT CHANGE UP (ref 22–31)
CREAT SERPL-MCNC: 0.98 MG/DL — SIGNIFICANT CHANGE UP (ref 0.5–1.3)
GLUCOSE BLDC GLUCOMTR-MCNC: 132 MG/DL — HIGH (ref 70–99)
GLUCOSE BLDC GLUCOMTR-MCNC: 137 MG/DL — HIGH (ref 70–99)
GLUCOSE BLDC GLUCOMTR-MCNC: 171 MG/DL — HIGH (ref 70–99)
GLUCOSE BLDC GLUCOMTR-MCNC: 201 MG/DL — HIGH (ref 70–99)
GLUCOSE SERPL-MCNC: 128 MG/DL — HIGH (ref 70–99)
HBA1C BLD-MCNC: 5.3 % — SIGNIFICANT CHANGE UP (ref 4–5.6)
HCT VFR BLD CALC: 29.6 % — LOW (ref 39–50)
HGB BLD-MCNC: 10 G/DL — LOW (ref 13–17)
INR BLD: 1.54 — HIGH (ref 0.88–1.16)
MAGNESIUM SERPL-MCNC: 1.9 MG/DL — SIGNIFICANT CHANGE UP (ref 1.6–2.6)
MCHC RBC-ENTMCNC: 31.8 PG — SIGNIFICANT CHANGE UP (ref 27–34)
MCHC RBC-ENTMCNC: 33.8 G/DL — SIGNIFICANT CHANGE UP (ref 32–36)
MCV RBC AUTO: 94.3 FL — SIGNIFICANT CHANGE UP (ref 80–100)
PHOSPHATE SERPL-MCNC: 2.4 MG/DL — LOW (ref 2.5–4.5)
PLATELET # BLD AUTO: 124 K/UL — LOW (ref 150–400)
POTASSIUM SERPL-MCNC: 3.9 MMOL/L — SIGNIFICANT CHANGE UP (ref 3.5–5.3)
POTASSIUM SERPL-SCNC: 3.9 MMOL/L — SIGNIFICANT CHANGE UP (ref 3.5–5.3)
PROT SERPL-MCNC: 5.5 G/DL — LOW (ref 6–8.3)
PROTHROM AB SERPL-ACNC: 17.3 SEC — HIGH (ref 9.8–12.7)
RBC # BLD: 3.14 M/UL — LOW (ref 4.2–5.8)
RBC # FLD: 14.8 % — SIGNIFICANT CHANGE UP (ref 10.3–16.9)
RH IG SCN BLD-IMP: POSITIVE — SIGNIFICANT CHANGE UP
SODIUM SERPL-SCNC: 138 MMOL/L — SIGNIFICANT CHANGE UP (ref 135–145)
WBC # BLD: 4.6 K/UL — SIGNIFICANT CHANGE UP (ref 3.8–10.5)
WBC # FLD AUTO: 4.6 K/UL — SIGNIFICANT CHANGE UP (ref 3.8–10.5)

## 2018-10-01 PROCEDURE — 43260 ERCP W/SPECIMEN COLLECTION: CPT | Mod: 53

## 2018-10-01 PROCEDURE — 99233 SBSQ HOSP IP/OBS HIGH 50: CPT | Mod: GC

## 2018-10-01 RX ADMIN — BUPROPION HYDROCHLORIDE 150 MILLIGRAM(S): 150 TABLET, EXTENDED RELEASE ORAL at 22:38

## 2018-10-01 RX ADMIN — Medication 1 MILLIGRAM(S): at 22:38

## 2018-10-01 RX ADMIN — HEPARIN SODIUM 5000 UNIT(S): 5000 INJECTION INTRAVENOUS; SUBCUTANEOUS at 14:46

## 2018-10-01 RX ADMIN — Medication 2: at 19:29

## 2018-10-01 RX ADMIN — SODIUM CHLORIDE 100 MILLILITER(S): 9 INJECTION INTRAMUSCULAR; INTRAVENOUS; SUBCUTANEOUS at 11:27

## 2018-10-01 RX ADMIN — EMTRICITABINE AND TENOFOVIR DISOPROXIL FUMARATE 1 TABLET(S): 200; 300 TABLET, FILM COATED ORAL at 22:38

## 2018-10-01 RX ADMIN — Medication 500 MILLIGRAM(S): at 19:25

## 2018-10-01 RX ADMIN — DARUNAVIR ETHANOLATE AND COBICISTAT 1 TABLET(S): 800; 150 TABLET, FILM COATED ORAL at 22:38

## 2018-10-01 RX ADMIN — HYDROMORPHONE HYDROCHLORIDE 8 MILLIGRAM(S): 2 INJECTION INTRAMUSCULAR; INTRAVENOUS; SUBCUTANEOUS at 14:52

## 2018-10-01 RX ADMIN — OXYCODONE HYDROCHLORIDE 60 MILLIGRAM(S): 5 TABLET ORAL at 06:20

## 2018-10-01 RX ADMIN — Medication 500 MILLIGRAM(S): at 06:19

## 2018-10-01 RX ADMIN — OXYCODONE HYDROCHLORIDE 60 MILLIGRAM(S): 5 TABLET ORAL at 07:10

## 2018-10-01 RX ADMIN — OXYCODONE HYDROCHLORIDE 60 MILLIGRAM(S): 5 TABLET ORAL at 19:50

## 2018-10-01 RX ADMIN — Medication 4: at 22:43

## 2018-10-01 RX ADMIN — HYDROMORPHONE HYDROCHLORIDE 8 MILLIGRAM(S): 2 INJECTION INTRAMUSCULAR; INTRAVENOUS; SUBCUTANEOUS at 15:15

## 2018-10-01 RX ADMIN — OXYCODONE HYDROCHLORIDE 60 MILLIGRAM(S): 5 TABLET ORAL at 19:25

## 2018-10-01 RX ADMIN — HYDROMORPHONE HYDROCHLORIDE 8 MILLIGRAM(S): 2 INJECTION INTRAMUSCULAR; INTRAVENOUS; SUBCUTANEOUS at 23:40

## 2018-10-01 RX ADMIN — HYDROMORPHONE HYDROCHLORIDE 8 MILLIGRAM(S): 2 INJECTION INTRAMUSCULAR; INTRAVENOUS; SUBCUTANEOUS at 22:42

## 2018-10-01 RX ADMIN — HEPARIN SODIUM 5000 UNIT(S): 5000 INJECTION INTRAVENOUS; SUBCUTANEOUS at 06:19

## 2018-10-01 RX ADMIN — HEPARIN SODIUM 5000 UNIT(S): 5000 INJECTION INTRAVENOUS; SUBCUTANEOUS at 22:38

## 2018-10-01 NOTE — PROGRESS NOTE ADULT - PROBLEM SELECTOR PLAN 1
Patient had a CT scan due to his abnormal labs and complaints w/ results reporting a 3cm x 2cm x 2cm mass on his pancreas w/ associated jaundice and abdominal cramping  - GI consulted  - Attempted ERCP, unable to stent, did sphincterotomy and will reassess on monday with repeat ERCP  - clears today  - f/u CA 19-9, CEA   - trend LFTs   - CT chest - for metastasis  - GI following, appreciate recs  - Gen surgery following Patient had a CT scan due to his abnormal labs and complaints w/ results reporting a 3cm x 2cm x 2cm mass on his pancreas w/ associated jaundice and abdominal cramping  - GI consulted  - Attempted ERCP, unable to stent, did sphincterotomy and will reassess on monday with repeat ERCP  - clears today  - CA 19-9 elevated, CEA 3.4  - trend LFTs   - CT chest - for metastasis  - GI following, appreciate recs  - Gen surgery following

## 2018-10-01 NOTE — PROGRESS NOTE ADULT - SUBJECTIVE AND OBJECTIVE BOX
OVERNIGHT EVENTS:  CLARITA    SUBJECTIVE / INTERVAL HPI: Patient seen and examined at bedside.   Patient laying comfortably in bed, awake and alert with no active complaints overnight.   Patient denies h/n/v/d, fever, chills, cp, palpitations, sob, leg swelling, rashes, dysuria, and changes in BM. Complains of mild epigastric/RUQ cramping pain that has remained unchanged. Also notes not having an appetite.     VITAL SIGNS:  Vital Signs Last 24 Hrs  T(C): 37.4 (01 Oct 2018 05:45), Max: 37.8 (30 Sep 2018 10:02)  T(F): 99.3 (01 Oct 2018 05:45), Max: 100.1 (30 Sep 2018 10:02)  HR: 71 (01 Oct 2018 05:45) (68 - 77)  BP: 114/65 (01 Oct 2018 05:45) (97/58 - 129/79)  BP(mean): --  RR: 15 (01 Oct 2018 05:45) (14 - 15)  SpO2: 97% (01 Oct 2018 05:45) (97% - 98%)    PHYSICAL EXAM:    General: WDWN, cachetic, yellow appearing  HEENT: NC/AT; PERRL, icteric sclera; MMM  Neck: supple  Cardiovascular: +S1/S2; RRR  Respiratory: CTA B/L; no W/R/R  Gastrointestinal: soft, NT/ND; +BSx4, no masses  Extremities: WWP; no edema, clubbing or cyanosis, muscle wasting evident  Vascular: 2+ radial, DP/PT pulses B/L  Neurological: AAOx3; no focal deficits    MEDICATIONS:  MEDICATIONS  (STANDING):  ALPRAZolam 1 milliGRAM(s) Oral every 24 hours  buPROPion XL . 150 milliGRAM(s) Oral every 24 hours  darunavir 800 mG/cobicstat 150 mG 1 Tablet(s) Oral every 24 hours  dextrose 5%. 1000 milliLiter(s) (50 mL/Hr) IV Continuous <Continuous>  dextrose 50% Injectable 12.5 Gram(s) IV Push once  dextrose 50% Injectable 25 Gram(s) IV Push once  dextrose 50% Injectable 25 Gram(s) IV Push once  emtricitabine 200 mG/tenofovir alafenamide 25 mG (DESCOVY) Tablet 1 Tablet(s) Oral every 24 hours  heparin  Injectable 5000 Unit(s) SubCutaneous every 8 hours  insulin lispro (HumaLOG) corrective regimen sliding scale   SubCutaneous Before meals and at bedtime  levoFLOXacin  Tablet 500 milliGRAM(s) Oral every 24 hours  metroNIDAZOLE    Tablet 500 milliGRAM(s) Oral every 8 hours  oxyCODONE  ER Tablet 60 milliGRAM(s) Oral every 12 hours  sodium chloride 0.9%. 1000 milliLiter(s) (100 mL/Hr) IV Continuous <Continuous>    MEDICATIONS  (PRN):  dextrose 40% Gel 15 Gram(s) Oral once PRN Blood Glucose LESS THAN 70 milliGRAM(s)/deciliter  glucagon  Injectable 1 milliGRAM(s) IntraMuscular once PRN Glucose LESS THAN 70 milligrams/deciliter  HYDROmorphone   Tablet 8 milliGRAM(s) Oral every 6 hours PRN Moderate Pain (4 - 6)      ALLERGIES:  Allergies    penicillins (Angioedema)    Intolerances        LABS:                        10.0   4.6   )-----------( 124      ( 01 Oct 2018 06:36 )             29.6     10-01    138  |  100  |  7   ----------------------------<  128<H>  3.9   |  27  |  0.98    Ca    9.1      01 Oct 2018 06:36  Phos  2.4     10-01  Mg     1.9     10-01    TPro  5.5<L>  /  Alb  3.5  /  TBili  9.4<H>  /  DBili  x   /  AST  53<H>  /  ALT  88<H>  /  AlkPhos  573<H>  10-01    PT/INR - ( 01 Oct 2018 06:36 )   PT: 17.3 sec;   INR: 1.54          PTT - ( 01 Oct 2018 06:36 )  PTT:33.7 sec    CAPILLARY BLOOD GLUCOSE      POCT Blood Glucose.: 132 mg/dL (01 Oct 2018 08:06)      RADIOLOGY & ADDITIONAL TESTS: Reviewed.

## 2018-10-01 NOTE — PROGRESS NOTE ADULT - SUBJECTIVE AND OBJECTIVE BOX
SUBJECTIVE:     Patient is not in acute distress. GI planning on repeat ERCP today.     darunavir 800 mG/cobicstat 150 mG 1 Tablet(s) Oral every 24 hours  emtricitabine 200 mG/tenofovir alafenamide 25 mG (DESCOVY) Tablet 1 Tablet(s) Oral every 24 hours  heparin  Injectable 5000 Unit(s) SubCutaneous every 8 hours  levoFLOXacin  Tablet 500 milliGRAM(s) Oral every 24 hours  metroNIDAZOLE    Tablet 500 milliGRAM(s) Oral every 8 hours      Vital Signs Last 24 Hrs  T(C): 37.4 (01 Oct 2018 05:45), Max: 37.7 (30 Sep 2018 11:52)  T(F): 99.3 (01 Oct 2018 05:45), Max: 99.8 (30 Sep 2018 11:52)  HR: 71 (01 Oct 2018 05:45) (68 - 77)  BP: 114/65 (01 Oct 2018 05:45) (97/58 - 129/79)  BP(mean): --  RR: 15 (01 Oct 2018 05:45) (14 - 15)  SpO2: 97% (01 Oct 2018 05:45) (97% - 98%)  I&O's Detail    30 Sep 2018 07:01  -  01 Oct 2018 07:00  --------------------------------------------------------  IN:    lactated ringers.: 1250 mL    sodium chloride 0.9%.: 1100 mL    Solution: 250 mL  Total IN: 2600 mL    OUT:  Total OUT: 0 mL    Total NET: 2600 mL          General: NAD, cachetic and jaundiced  C/V: NSR  Pulm: Nonlabored breathing, no respiratory distress  Abd: soft, NT/ND.  Extrem: WWP, no edema, SCDs in place        LABS:                        10.0   4.6   )-----------( 124      ( 01 Oct 2018 06:36 )             29.6     10-01    138  |  100  |  7   ----------------------------<  128<H>  3.9   |  27  |  0.98    Ca    9.1      01 Oct 2018 06:36  Phos  2.4     10-01  Mg     1.9     10-01    TPro  5.5<L>  /  Alb  3.5  /  TBili  9.4<H>  /  DBili  x   /  AST  53<H>  /  ALT  88<H>  /  AlkPhos  573<H>  10-01    PT/INR - ( 01 Oct 2018 06:36 )   PT: 17.3 sec;   INR: 1.54          PTT - ( 01 Oct 2018 06:36 )  PTT:33.7 sec      RADIOLOGY & ADDITIONAL STUDIES:

## 2018-10-01 NOTE — PROGRESS NOTE ADULT - ASSESSMENT
57 year old man with past medical history of HIV (CD4 195, viral load undetectable), GERD, osteoarthiritis, and bilateral sciatic, presents w/ new onset pancreatic mass.    - CT chest does not show evidence of pulmonary mets  - Patient is known to have distal pancreatic tail mass, but also has obstructive symptoms. EUS should be done concurrently with repeat ERCP today.  - Plan discussed with attending and chief resident.

## 2018-10-02 LAB
ALBUMIN SERPL ELPH-MCNC: 3.2 G/DL — LOW (ref 3.3–5)
ALP SERPL-CCNC: 544 U/L — HIGH (ref 40–120)
ALT FLD-CCNC: 80 U/L — HIGH (ref 10–45)
ANION GAP SERPL CALC-SCNC: 11 MMOL/L — SIGNIFICANT CHANGE UP (ref 5–17)
AST SERPL-CCNC: 63 U/L — HIGH (ref 10–40)
BILIRUB SERPL-MCNC: 9.6 MG/DL — HIGH (ref 0.2–1.2)
BUN SERPL-MCNC: 10 MG/DL — SIGNIFICANT CHANGE UP (ref 7–23)
CALCIUM SERPL-MCNC: 8.9 MG/DL — SIGNIFICANT CHANGE UP (ref 8.4–10.5)
CHLORIDE SERPL-SCNC: 98 MMOL/L — SIGNIFICANT CHANGE UP (ref 96–108)
CO2 SERPL-SCNC: 26 MMOL/L — SIGNIFICANT CHANGE UP (ref 22–31)
CREAT SERPL-MCNC: 1.03 MG/DL — SIGNIFICANT CHANGE UP (ref 0.5–1.3)
GLUCOSE BLDC GLUCOMTR-MCNC: 101 MG/DL — HIGH (ref 70–99)
GLUCOSE BLDC GLUCOMTR-MCNC: 146 MG/DL — HIGH (ref 70–99)
GLUCOSE BLDC GLUCOMTR-MCNC: 208 MG/DL — HIGH (ref 70–99)
GLUCOSE BLDC GLUCOMTR-MCNC: 84 MG/DL — SIGNIFICANT CHANGE UP (ref 70–99)
GLUCOSE SERPL-MCNC: 76 MG/DL — SIGNIFICANT CHANGE UP (ref 70–99)
HCT VFR BLD CALC: 27.8 % — LOW (ref 39–50)
HGB BLD-MCNC: 9.3 G/DL — LOW (ref 13–17)
MAGNESIUM SERPL-MCNC: 1.8 MG/DL — SIGNIFICANT CHANGE UP (ref 1.6–2.6)
MCHC RBC-ENTMCNC: 31.8 PG — SIGNIFICANT CHANGE UP (ref 27–34)
MCHC RBC-ENTMCNC: 33.5 G/DL — SIGNIFICANT CHANGE UP (ref 32–36)
MCV RBC AUTO: 95.2 FL — SIGNIFICANT CHANGE UP (ref 80–100)
PLATELET # BLD AUTO: 132 K/UL — LOW (ref 150–400)
POTASSIUM SERPL-MCNC: 3.7 MMOL/L — SIGNIFICANT CHANGE UP (ref 3.5–5.3)
POTASSIUM SERPL-SCNC: 3.7 MMOL/L — SIGNIFICANT CHANGE UP (ref 3.5–5.3)
PROT SERPL-MCNC: 5.3 G/DL — LOW (ref 6–8.3)
RBC # BLD: 2.92 M/UL — LOW (ref 4.2–5.8)
RBC # FLD: 14.9 % — SIGNIFICANT CHANGE UP (ref 10.3–16.9)
SODIUM SERPL-SCNC: 135 MMOL/L — SIGNIFICANT CHANGE UP (ref 135–145)
SURGICAL PATHOLOGY STUDY: SIGNIFICANT CHANGE UP
WBC # BLD: 5.5 K/UL — SIGNIFICANT CHANGE UP (ref 3.8–10.5)
WBC # FLD AUTO: 5.5 K/UL — SIGNIFICANT CHANGE UP (ref 3.8–10.5)

## 2018-10-02 PROCEDURE — 99233 SBSQ HOSP IP/OBS HIGH 50: CPT

## 2018-10-02 PROCEDURE — 47534 PLMT BILIARY DRAINAGE CATH: CPT

## 2018-10-02 PROCEDURE — 43242 EGD US FINE NEEDLE BX/ASPIR: CPT

## 2018-10-02 RX ORDER — POTASSIUM CHLORIDE 20 MEQ
40 PACKET (EA) ORAL ONCE
Qty: 0 | Refills: 0 | Status: COMPLETED | OUTPATIENT
Start: 2018-10-02 | End: 2018-10-02

## 2018-10-02 RX ORDER — MAGNESIUM SULFATE 500 MG/ML
2 VIAL (ML) INJECTION ONCE
Qty: 0 | Refills: 0 | Status: COMPLETED | OUTPATIENT
Start: 2018-10-02 | End: 2018-10-02

## 2018-10-02 RX ADMIN — Medication 500 MILLIGRAM(S): at 17:52

## 2018-10-02 RX ADMIN — Medication 1 MILLIGRAM(S): at 22:59

## 2018-10-02 RX ADMIN — OXYCODONE HYDROCHLORIDE 60 MILLIGRAM(S): 5 TABLET ORAL at 17:52

## 2018-10-02 RX ADMIN — HEPARIN SODIUM 5000 UNIT(S): 5000 INJECTION INTRAVENOUS; SUBCUTANEOUS at 13:15

## 2018-10-02 RX ADMIN — OXYCODONE HYDROCHLORIDE 60 MILLIGRAM(S): 5 TABLET ORAL at 05:44

## 2018-10-02 RX ADMIN — HYDROMORPHONE HYDROCHLORIDE 8 MILLIGRAM(S): 2 INJECTION INTRAMUSCULAR; INTRAVENOUS; SUBCUTANEOUS at 22:59

## 2018-10-02 RX ADMIN — Medication 500 MILLIGRAM(S): at 01:35

## 2018-10-02 RX ADMIN — HEPARIN SODIUM 5000 UNIT(S): 5000 INJECTION INTRAVENOUS; SUBCUTANEOUS at 05:44

## 2018-10-02 RX ADMIN — BUPROPION HYDROCHLORIDE 150 MILLIGRAM(S): 150 TABLET, EXTENDED RELEASE ORAL at 22:59

## 2018-10-02 RX ADMIN — HEPARIN SODIUM 5000 UNIT(S): 5000 INJECTION INTRAVENOUS; SUBCUTANEOUS at 22:59

## 2018-10-02 RX ADMIN — Medication 50 GRAM(S): at 08:39

## 2018-10-02 RX ADMIN — HYDROMORPHONE HYDROCHLORIDE 8 MILLIGRAM(S): 2 INJECTION INTRAMUSCULAR; INTRAVENOUS; SUBCUTANEOUS at 23:30

## 2018-10-02 RX ADMIN — EMTRICITABINE AND TENOFOVIR DISOPROXIL FUMARATE 1 TABLET(S): 200; 300 TABLET, FILM COATED ORAL at 22:59

## 2018-10-02 RX ADMIN — SODIUM CHLORIDE 100 MILLILITER(S): 9 INJECTION INTRAMUSCULAR; INTRAVENOUS; SUBCUTANEOUS at 18:02

## 2018-10-02 RX ADMIN — OXYCODONE HYDROCHLORIDE 60 MILLIGRAM(S): 5 TABLET ORAL at 06:40

## 2018-10-02 RX ADMIN — SODIUM CHLORIDE 100 MILLILITER(S): 9 INJECTION INTRAMUSCULAR; INTRAVENOUS; SUBCUTANEOUS at 12:28

## 2018-10-02 RX ADMIN — Medication 40 MILLIEQUIVALENT(S): at 12:28

## 2018-10-02 RX ADMIN — OXYCODONE HYDROCHLORIDE 60 MILLIGRAM(S): 5 TABLET ORAL at 18:30

## 2018-10-02 RX ADMIN — Medication 500 MILLIGRAM(S): at 22:59

## 2018-10-02 RX ADMIN — DARUNAVIR ETHANOLATE AND COBICISTAT 1 TABLET(S): 800; 150 TABLET, FILM COATED ORAL at 22:59

## 2018-10-02 RX ADMIN — Medication 4: at 22:59

## 2018-10-02 NOTE — PROGRESS NOTE ADULT - PROBLEM SELECTOR PLAN 1
Patient had a CT scan due to his abnormal labs and complaints w/ results reporting a 3cm x 2cm x 2cm mass on his pancreas w/ associated jaundice and abdominal cramping  - GI consulted  - Attempted ERCPx2 s/p sphincterotomy, unable to stent  - NPO for PTC/EUS today by IR  - CA 19-9 elevated, CEA 3.4  - trend LFTs   - CT chest - for metastasis  - GI following, appreciate recs  - Gen surgery following

## 2018-10-02 NOTE — PROGRESS NOTE ADULT - SUBJECTIVE AND OBJECTIVE BOX
OVERNIGHT EVENTS:  CLARITA  SUBJECTIVE / INTERVAL HPI: Patient seen and examined at bedside.   Patient laying comfortably in bed, awake and alert with no active complaints overnight.   Patient denies h/n/v/d, fever, chills, cp, palpitations, sob, abd pain, leg swelling, rashes, dysuria. Had ERCP again yesterday that failed stenting. NPO after midnight for PTC and EUS done by IR/GI today. Has not had BM since Friday night.     VITAL SIGNS:  Vital Signs Last 24 Hrs  T(C): 36.9 (02 Oct 2018 05:17), Max: 36.9 (01 Oct 2018 19:16)  T(F): 98.4 (02 Oct 2018 05:17), Max: 98.5 (01 Oct 2018 19:16)  HR: 68 (02 Oct 2018 05:17) (64 - 77)  BP: 90/53 (02 Oct 2018 05:17) (90/53 - 106/66)  BP(mean): --  RR: 16 (02 Oct 2018 05:17) (16 - 16)  SpO2: 98% (02 Oct 2018 05:17) (95% - 98%)    PHYSICAL EXAM:    General: WDWN, cachetic, yellow appearing  HEENT: NC/AT; PERRL, icteric sclera; MMM  Neck: supple  Cardiovascular: +S1/S2; RRR  Respiratory: CTA B/L; no W/R/R  Gastrointestinal: soft, NT/ND; +BSx4, no masses  Extremities: WWP; no edema, clubbing or cyanosis, muscle wasting evident  Vascular: 2+ radial, DP/PT pulses B/L  Neurological: AAOx3; no focal deficits    MEDICATIONS:  MEDICATIONS  (STANDING):  ALPRAZolam 1 milliGRAM(s) Oral every 24 hours  buPROPion XL . 150 milliGRAM(s) Oral every 24 hours  darunavir 800 mG/cobicstat 150 mG 1 Tablet(s) Oral every 24 hours  dextrose 5%. 1000 milliLiter(s) (50 mL/Hr) IV Continuous <Continuous>  dextrose 50% Injectable 12.5 Gram(s) IV Push once  dextrose 50% Injectable 25 Gram(s) IV Push once  dextrose 50% Injectable 25 Gram(s) IV Push once  emtricitabine 200 mG/tenofovir alafenamide 25 mG (DESCOVY) Tablet 1 Tablet(s) Oral every 24 hours  heparin  Injectable 5000 Unit(s) SubCutaneous every 8 hours  insulin lispro (HumaLOG) corrective regimen sliding scale   SubCutaneous Before meals and at bedtime  levoFLOXacin  Tablet 500 milliGRAM(s) Oral every 24 hours  magnesium sulfate  IVPB 2 Gram(s) IV Intermittent once  metroNIDAZOLE    Tablet 500 milliGRAM(s) Oral every 8 hours  oxyCODONE  ER Tablet 60 milliGRAM(s) Oral every 12 hours  potassium chloride    Tablet ER 40 milliEquivalent(s) Oral once  sodium chloride 0.9%. 1000 milliLiter(s) (100 mL/Hr) IV Continuous <Continuous>    MEDICATIONS  (PRN):  dextrose 40% Gel 15 Gram(s) Oral once PRN Blood Glucose LESS THAN 70 milliGRAM(s)/deciliter  glucagon  Injectable 1 milliGRAM(s) IntraMuscular once PRN Glucose LESS THAN 70 milligrams/deciliter  HYDROmorphone   Tablet 8 milliGRAM(s) Oral every 6 hours PRN Moderate Pain (4 - 6)      ALLERGIES:  Allergies    penicillins (Angioedema)    Intolerances        LABS:                        9.3    5.5   )-----------( 132      ( 02 Oct 2018 05:58 )             27.8     10-02    135  |  98  |  10  ----------------------------<  76  3.7   |  26  |  1.03    Ca    8.9      02 Oct 2018 05:58  Phos  2.4     10-01  Mg     1.8     10-02    TPro  5.3<L>  /  Alb  3.2<L>  /  TBili  9.6<H>  /  DBili  x   /  AST  63<H>  /  ALT  80<H>  /  AlkPhos  544<H>  10-02    PT/INR - ( 01 Oct 2018 06:36 )   PT: 17.3 sec;   INR: 1.54          PTT - ( 01 Oct 2018 06:36 )  PTT:33.7 sec    CAPILLARY BLOOD GLUCOSE      POCT Blood Glucose.: 84 mg/dL (02 Oct 2018 08:09)      RADIOLOGY & ADDITIONAL TESTS: Reviewed. Off service:    Hospital course- 56 YO M h/o HIV (CD4 195, VL UD),  B/L sciatica referred by his PMD for abnormal LFTs and CT scan showing a 2X3X3 cm mass on the pancreas obstructing the pancreatic duct. Bilirubin on admission 11 (direct 8.4). Patient was visibly jaundiced with signs of cachexia. CT chest negative for thoracic metastatic disease  On ceftriaxone/flagyl as prophylaxis. Underwent an unsuccessful ERCP. Sphincterotomy was performed and ERCP was tried again and was also unsuccessful for stent placement. Ampulla biopsy was benign, IR procedure to place biliary drain showed minimal to mild dilatation of the peripheral right and left bile ducts. There is moderate dilatation of the right and left main hepatic ducts and the proximal bile duct above the level of obstruction in the pancreatic head. EUS performed by GI.     OVERNIGHT EVENTS:  CLARITA  SUBJECTIVE / INTERVAL HPI: Patient seen and examined at bedside.   Patient laying comfortably in bed, awake and alert with no active complaints overnight.   Patient denies h/n/v/d, fever, chills, cp, palpitations, sob, abd pain, leg swelling, rashes, dysuria. Had ERCP again yesterday that failed stenting. NPO after midnight for PTC and EUS done by IR/GI today. Has not had BM since Friday night.     VITAL SIGNS:  Vital Signs Last 24 Hrs  T(C): 36.9 (02 Oct 2018 05:17), Max: 36.9 (01 Oct 2018 19:16)  T(F): 98.4 (02 Oct 2018 05:17), Max: 98.5 (01 Oct 2018 19:16)  HR: 68 (02 Oct 2018 05:17) (64 - 77)  BP: 90/53 (02 Oct 2018 05:17) (90/53 - 106/66)  BP(mean): --  RR: 16 (02 Oct 2018 05:17) (16 - 16)  SpO2: 98% (02 Oct 2018 05:17) (95% - 98%)    PHYSICAL EXAM:    General: WDWN, cachetic, yellow appearing  HEENT: NC/AT; PERRL, icteric sclera; MMM  Neck: supple  Cardiovascular: +S1/S2; RRR  Respiratory: CTA B/L; no W/R/R  Gastrointestinal: soft, NT/ND; +BSx4, no masses  Extremities: WWP; no edema, clubbing or cyanosis, muscle wasting evident  Vascular: 2+ radial, DP/PT pulses B/L  Neurological: AAOx3; no focal deficits    MEDICATIONS:  MEDICATIONS  (STANDING):  ALPRAZolam 1 milliGRAM(s) Oral every 24 hours  buPROPion XL . 150 milliGRAM(s) Oral every 24 hours  darunavir 800 mG/cobicstat 150 mG 1 Tablet(s) Oral every 24 hours  dextrose 5%. 1000 milliLiter(s) (50 mL/Hr) IV Continuous <Continuous>  dextrose 50% Injectable 12.5 Gram(s) IV Push once  dextrose 50% Injectable 25 Gram(s) IV Push once  dextrose 50% Injectable 25 Gram(s) IV Push once  emtricitabine 200 mG/tenofovir alafenamide 25 mG (DESCOVY) Tablet 1 Tablet(s) Oral every 24 hours  heparin  Injectable 5000 Unit(s) SubCutaneous every 8 hours  insulin lispro (HumaLOG) corrective regimen sliding scale   SubCutaneous Before meals and at bedtime  levoFLOXacin  Tablet 500 milliGRAM(s) Oral every 24 hours  magnesium sulfate  IVPB 2 Gram(s) IV Intermittent once  metroNIDAZOLE    Tablet 500 milliGRAM(s) Oral every 8 hours  oxyCODONE  ER Tablet 60 milliGRAM(s) Oral every 12 hours  potassium chloride    Tablet ER 40 milliEquivalent(s) Oral once  sodium chloride 0.9%. 1000 milliLiter(s) (100 mL/Hr) IV Continuous <Continuous>    MEDICATIONS  (PRN):  dextrose 40% Gel 15 Gram(s) Oral once PRN Blood Glucose LESS THAN 70 milliGRAM(s)/deciliter  glucagon  Injectable 1 milliGRAM(s) IntraMuscular once PRN Glucose LESS THAN 70 milligrams/deciliter  HYDROmorphone   Tablet 8 milliGRAM(s) Oral every 6 hours PRN Moderate Pain (4 - 6)      ALLERGIES:  Allergies    penicillins (Angioedema)    Intolerances        LABS:                        9.3    5.5   )-----------( 132      ( 02 Oct 2018 05:58 )             27.8     10-02    135  |  98  |  10  ----------------------------<  76  3.7   |  26  |  1.03    Ca    8.9      02 Oct 2018 05:58  Phos  2.4     10-01  Mg     1.8     10-02    TPro  5.3<L>  /  Alb  3.2<L>  /  TBili  9.6<H>  /  DBili  x   /  AST  63<H>  /  ALT  80<H>  /  AlkPhos  544<H>  10-02    PT/INR - ( 01 Oct 2018 06:36 )   PT: 17.3 sec;   INR: 1.54          PTT - ( 01 Oct 2018 06:36 )  PTT:33.7 sec    CAPILLARY BLOOD GLUCOSE      POCT Blood Glucose.: 84 mg/dL (02 Oct 2018 08:09)      RADIOLOGY & ADDITIONAL TESTS: Reviewed.

## 2018-10-02 NOTE — PROGRESS NOTE ADULT - SUBJECTIVE AND OBJECTIVE BOX
SUBJECTIVE:    ERCP reattempted failed yesterday; unable to cannulate ampulla. GI is recommending PTC. Patient scheduled for EUS today.     darunavir 800 mG/cobicstat 150 mG 1 Tablet(s) Oral every 24 hours  emtricitabine 200 mG/tenofovir alafenamide 25 mG (DESCOVY) Tablet 1 Tablet(s) Oral every 24 hours  heparin  Injectable 5000 Unit(s) SubCutaneous every 8 hours  levoFLOXacin  Tablet 500 milliGRAM(s) Oral every 24 hours  metroNIDAZOLE    Tablet 500 milliGRAM(s) Oral every 8 hours      Vital Signs Last 24 Hrs  T(C): 36.9 (02 Oct 2018 05:17), Max: 36.9 (01 Oct 2018 19:16)  T(F): 98.4 (02 Oct 2018 05:17), Max: 98.5 (01 Oct 2018 19:16)  HR: 68 (02 Oct 2018 05:17) (64 - 77)  BP: 90/53 (02 Oct 2018 05:17) (90/53 - 106/66)  BP(mean): --  RR: 16 (02 Oct 2018 05:17) (16 - 16)  SpO2: 98% (02 Oct 2018 05:17) (95% - 98%)  I&O's Detail    01 Oct 2018 07:01  -  02 Oct 2018 07:00  --------------------------------------------------------  IN:    sodium chloride 0.9%.: 2300 mL  Total IN: 2300 mL    OUT:  Total OUT: 0 mL    Total NET: 2300 mL          General: NAD, cachetic and jaundiced  C/V: NSR  Pulm: Nonlabored breathing, no respiratory distress  Abd: soft, NT/ND.  Extrem: WWP, no edema, SCDs in place        LABS:                        9.3    5.5   )-----------( 132      ( 02 Oct 2018 05:58 )             27.8     10-02    135  |  98  |  10  ----------------------------<  76  3.7   |  26  |  1.03    Ca    8.9      02 Oct 2018 05:58  Phos  2.4     10-01  Mg     1.8     10-02    TPro  5.3<L>  /  Alb  3.2<L>  /  TBili  9.6<H>  /  DBili  x   /  AST  63<H>  /  ALT  80<H>  /  AlkPhos  544<H>  10-02    PT/INR - ( 01 Oct 2018 06:36 )   PT: 17.3 sec;   INR: 1.54          PTT - ( 01 Oct 2018 06:36 )  PTT:33.7 sec      RADIOLOGY & ADDITIONAL STUDIES:

## 2018-10-02 NOTE — PROGRESS NOTE ADULT - ASSESSMENT
57 year old man with past medical history of HIV (CD4 195, viral load undetectable), GERD, osteoarthiritis, and bilateral sciatic, presents w/ new onset pancreatic mass.    - CT chest does not show evidence of pulmonary mets  - Agree with GI recommendation for PTC.  - EUS today with GI  - Plan discussed with attending.

## 2018-10-03 DIAGNOSIS — R73.9 HYPERGLYCEMIA, UNSPECIFIED: ICD-10-CM

## 2018-10-03 DIAGNOSIS — R74.0 NONSPECIFIC ELEVATION OF LEVELS OF TRANSAMINASE AND LACTIC ACID DEHYDROGENASE [LDH]: ICD-10-CM

## 2018-10-03 LAB
ALBUMIN SERPL ELPH-MCNC: 3.3 G/DL — SIGNIFICANT CHANGE UP (ref 3.3–5)
ALP SERPL-CCNC: 496 U/L — HIGH (ref 40–120)
ALT FLD-CCNC: 75 U/L — HIGH (ref 10–45)
ANION GAP SERPL CALC-SCNC: 11 MMOL/L — SIGNIFICANT CHANGE UP (ref 5–17)
AST SERPL-CCNC: 43 U/L — HIGH (ref 10–40)
BILIRUB SERPL-MCNC: 5.9 MG/DL — HIGH (ref 0.2–1.2)
BUN SERPL-MCNC: 11 MG/DL — SIGNIFICANT CHANGE UP (ref 7–23)
CALCIUM SERPL-MCNC: 9.2 MG/DL — SIGNIFICANT CHANGE UP (ref 8.4–10.5)
CHLORIDE SERPL-SCNC: 97 MMOL/L — SIGNIFICANT CHANGE UP (ref 96–108)
CO2 SERPL-SCNC: 27 MMOL/L — SIGNIFICANT CHANGE UP (ref 22–31)
CREAT SERPL-MCNC: 0.94 MG/DL — SIGNIFICANT CHANGE UP (ref 0.5–1.3)
GLUCOSE BLDC GLUCOMTR-MCNC: 153 MG/DL — HIGH (ref 70–99)
GLUCOSE BLDC GLUCOMTR-MCNC: 158 MG/DL — HIGH (ref 70–99)
GLUCOSE BLDC GLUCOMTR-MCNC: 191 MG/DL — HIGH (ref 70–99)
GLUCOSE BLDC GLUCOMTR-MCNC: 271 MG/DL — HIGH (ref 70–99)
GLUCOSE BLDC GLUCOMTR-MCNC: 349 MG/DL — HIGH (ref 70–99)
GLUCOSE SERPL-MCNC: 249 MG/DL — HIGH (ref 70–99)
GRAM STN FLD: SIGNIFICANT CHANGE UP
HCT VFR BLD CALC: 29.2 % — LOW (ref 39–50)
HGB BLD-MCNC: 9.8 G/DL — LOW (ref 13–17)
MAGNESIUM SERPL-MCNC: 2.1 MG/DL — SIGNIFICANT CHANGE UP (ref 1.6–2.6)
MCHC RBC-ENTMCNC: 31.7 PG — SIGNIFICANT CHANGE UP (ref 27–34)
MCHC RBC-ENTMCNC: 33.6 G/DL — SIGNIFICANT CHANGE UP (ref 32–36)
MCV RBC AUTO: 94.5 FL — SIGNIFICANT CHANGE UP (ref 80–100)
PLATELET # BLD AUTO: 161 K/UL — SIGNIFICANT CHANGE UP (ref 150–400)
POTASSIUM SERPL-MCNC: 4.9 MMOL/L — SIGNIFICANT CHANGE UP (ref 3.5–5.3)
POTASSIUM SERPL-SCNC: 4.9 MMOL/L — SIGNIFICANT CHANGE UP (ref 3.5–5.3)
PROT SERPL-MCNC: 5.6 G/DL — LOW (ref 6–8.3)
RBC # BLD: 3.09 M/UL — LOW (ref 4.2–5.8)
RBC # FLD: 14.8 % — SIGNIFICANT CHANGE UP (ref 10.3–16.9)
SODIUM SERPL-SCNC: 135 MMOL/L — SIGNIFICANT CHANGE UP (ref 135–145)
SPECIMEN SOURCE: SIGNIFICANT CHANGE UP
WBC # BLD: 6.8 K/UL — SIGNIFICANT CHANGE UP (ref 3.8–10.5)
WBC # FLD AUTO: 6.8 K/UL — SIGNIFICANT CHANGE UP (ref 3.8–10.5)

## 2018-10-03 PROCEDURE — 99233 SBSQ HOSP IP/OBS HIGH 50: CPT

## 2018-10-03 RX ORDER — INSULIN GLARGINE 100 [IU]/ML
8 INJECTION, SOLUTION SUBCUTANEOUS EVERY MORNING
Qty: 0 | Refills: 0 | Status: DISCONTINUED | OUTPATIENT
Start: 2018-10-03 | End: 2018-10-04

## 2018-10-03 RX ORDER — INSULIN LISPRO 100/ML
2 VIAL (ML) SUBCUTANEOUS
Qty: 0 | Refills: 0 | Status: DISCONTINUED | OUTPATIENT
Start: 2018-10-03 | End: 2018-10-04

## 2018-10-03 RX ADMIN — HEPARIN SODIUM 5000 UNIT(S): 5000 INJECTION INTRAVENOUS; SUBCUTANEOUS at 22:30

## 2018-10-03 RX ADMIN — Medication 2: at 18:00

## 2018-10-03 RX ADMIN — OXYCODONE HYDROCHLORIDE 60 MILLIGRAM(S): 5 TABLET ORAL at 18:00

## 2018-10-03 RX ADMIN — HYDROMORPHONE HYDROCHLORIDE 8 MILLIGRAM(S): 2 INJECTION INTRAMUSCULAR; INTRAVENOUS; SUBCUTANEOUS at 22:30

## 2018-10-03 RX ADMIN — Medication 2 UNIT(S): at 17:59

## 2018-10-03 RX ADMIN — HYDROMORPHONE HYDROCHLORIDE 8 MILLIGRAM(S): 2 INJECTION INTRAMUSCULAR; INTRAVENOUS; SUBCUTANEOUS at 16:45

## 2018-10-03 RX ADMIN — HEPARIN SODIUM 5000 UNIT(S): 5000 INJECTION INTRAVENOUS; SUBCUTANEOUS at 14:42

## 2018-10-03 RX ADMIN — DARUNAVIR ETHANOLATE AND COBICISTAT 1 TABLET(S): 800; 150 TABLET, FILM COATED ORAL at 22:29

## 2018-10-03 RX ADMIN — Medication 2: at 09:01

## 2018-10-03 RX ADMIN — Medication 1 MILLIGRAM(S): at 22:30

## 2018-10-03 RX ADMIN — Medication 8: at 12:34

## 2018-10-03 RX ADMIN — HYDROMORPHONE HYDROCHLORIDE 8 MILLIGRAM(S): 2 INJECTION INTRAMUSCULAR; INTRAVENOUS; SUBCUTANEOUS at 22:31

## 2018-10-03 RX ADMIN — Medication 2: at 22:30

## 2018-10-03 RX ADMIN — EMTRICITABINE AND TENOFOVIR DISOPROXIL FUMARATE 1 TABLET(S): 200; 300 TABLET, FILM COATED ORAL at 22:29

## 2018-10-03 RX ADMIN — BUPROPION HYDROCHLORIDE 150 MILLIGRAM(S): 150 TABLET, EXTENDED RELEASE ORAL at 22:30

## 2018-10-03 RX ADMIN — OXYCODONE HYDROCHLORIDE 60 MILLIGRAM(S): 5 TABLET ORAL at 18:30

## 2018-10-03 RX ADMIN — HEPARIN SODIUM 5000 UNIT(S): 5000 INJECTION INTRAVENOUS; SUBCUTANEOUS at 07:13

## 2018-10-03 RX ADMIN — OXYCODONE HYDROCHLORIDE 60 MILLIGRAM(S): 5 TABLET ORAL at 07:45

## 2018-10-03 RX ADMIN — OXYCODONE HYDROCHLORIDE 60 MILLIGRAM(S): 5 TABLET ORAL at 07:13

## 2018-10-03 RX ADMIN — Medication 500 MILLIGRAM(S): at 07:13

## 2018-10-03 RX ADMIN — HYDROMORPHONE HYDROCHLORIDE 8 MILLIGRAM(S): 2 INJECTION INTRAMUSCULAR; INTRAVENOUS; SUBCUTANEOUS at 16:18

## 2018-10-03 NOTE — PROGRESS NOTE ADULT - SUBJECTIVE AND OBJECTIVE BOX
Pt seen and examined at bedside. CLARITA overnight. Pt feels better today, denies abdominal pain, nausea, vomiting, melena, hematochezia. Tolerating diet. Noted to have 1.5 L of bilious output via biliary drain.     Allergies  penicillins (Angioedema)    MEDICATIONS:  MEDICATIONS  (STANDING):  ALPRAZolam 1 milliGRAM(s) Oral every 24 hours  buPROPion XL . 150 milliGRAM(s) Oral every 24 hours  darunavir 800 mG/cobicstat 150 mG 1 Tablet(s) Oral every 24 hours  dextrose 5%. 1000 milliLiter(s) (50 mL/Hr) IV Continuous <Continuous>  dextrose 50% Injectable 12.5 Gram(s) IV Push once  dextrose 50% Injectable 25 Gram(s) IV Push once  dextrose 50% Injectable 25 Gram(s) IV Push once  emtricitabine 200 mG/tenofovir alafenamide 25 mG (DESCOVY) Tablet 1 Tablet(s) Oral every 24 hours  heparin  Injectable 5000 Unit(s) SubCutaneous every 8 hours  insulin lispro (HumaLOG) corrective regimen sliding scale   SubCutaneous Before meals and at bedtime  oxyCODONE  ER Tablet 60 milliGRAM(s) Oral every 12 hours  sodium chloride 0.9%. 1000 milliLiter(s) (100 mL/Hr) IV Continuous <Continuous>    MEDICATIONS  (PRN):  dextrose 40% Gel 15 Gram(s) Oral once PRN Blood Glucose LESS THAN 70 milliGRAM(s)/deciliter  glucagon  Injectable 1 milliGRAM(s) IntraMuscular once PRN Glucose LESS THAN 70 milligrams/deciliter  HYDROmorphone   Tablet 8 milliGRAM(s) Oral every 6 hours PRN Moderate Pain (4 - 6)    Vital Signs Last 24 Hrs  T(C): 37.3 (03 Oct 2018 11:08), Max: 37.5 (02 Oct 2018 18:36)  T(F): 99.2 (03 Oct 2018 11:08), Max: 99.5 (02 Oct 2018 18:36)  HR: 74 (03 Oct 2018 11:08) (69 - 77)  BP: 107/63 (03 Oct 2018 11:08) (104/61 - 131/86)  BP(mean): --  RR: 16 (03 Oct 2018 11:08) (16 - 16)  SpO2: 94% (03 Oct 2018 11:08) (94% - 96%)    10-02 @ 07:01  -  10-03 @ 07:00  --------------------------------------------------------  IN: 1200 mL / OUT: 1500 mL / NET: -300 mL    10-03 @ 07:01  -  10-03 @ 12:14  --------------------------------------------------------  IN: 500 mL / OUT: 450 mL / NET: 50 mL    PHYSICAL EXAM:    General: Frail ; in no acute distress  HEENT: MMM, Scleral icterus B/L  Gastrointestinal: Soft non-tender non-distended; No rebound or guarding; biliary drain in place with bilious drainage  Skin: Warm and dry. No obvious rash. Jaundice    LABS:                        9.8    6.8   )-----------( 161      ( 03 Oct 2018 05:50 )             29.2     10-03    135  |  97  |  11  ----------------------------<  249<H>  4.9   |  27  |  0.94    Ca    9.2      03 Oct 2018 05:50  Mg     2.1     10-03    TPro  5.6<L>  /  Alb  3.3  /  TBili  5.9<H>  /  DBili  x   /  AST  43<H>  /  ALT  75<H>  /  AlkPhos  496<H>  10-03    Culture - Body Fluid with Gram Stain (collected 02 Oct 2018 12:35)  Source: .Body Fluid Bile  Gram Stain (03 Oct 2018 09:48):    No organisms seen    Few White blood cells  Preliminary Report (03 Oct 2018 09:58):    No growth to date.    Surgical Pathology Report:   ACCESSION No:  [id number]    GALINDO FERNANDEZ                       1        Surgical Final Report          Final Diagnosis  1. Ampulla, biopsy:  - Strips of benign biliary type epithelium.    Note:  Multiple deeper levels are examined.    Verified by: Siva Murphy MD  (Electronic Signature)  Reported on: 10/02/18 10:06 EDT, [address] [place] [address], [place]  [address]  _________________________________________________________________    Clinical History  57-year-old with elevated LFTs, pancreatic mass (tail).    Specimen(s) Submitted  1. Ampulla biopsy    Gross Description  The specimen is received in formalin, labeled with the patient's  identification and "ampulla."  It consists of two tan-pink  irregular soft tissue fragments, each measuring 0.1 cm in  greatest dimension, submitted in toto in one cassette, 1A.  MGC 09/28/18 16:31 (09.28.18 @ 13:35)        RADIOLOGY & ADDITIONAL STUDIES: No new radiologic studies.

## 2018-10-03 NOTE — PROGRESS NOTE ADULT - PROBLEM SELECTOR PLAN 7
F: not necessary  E: replete as necessary  N: Full liquid diet heparin 5000q 8 hours, possible malignancy therefore intermediate risk for DVT

## 2018-10-03 NOTE — PROGRESS NOTE ADULT - PROBLEM SELECTOR PLAN 4
Patient with extensive pain medication for arthritis   - c/w home medications to avoid withdrawal:  - hydromorphone 8mg q 6 hours prn for breakthrough  - oxycontin 60mg q 12 hours standing - C/w ISS  - C/w lantus 8 units QHS  - C/w lispro 2 units premeal

## 2018-10-03 NOTE — CHART NOTE - NSCHARTNOTEFT_GEN_A_CORE
Admitting Diagnosis:   Patient is a 57y old  Male who presents with a chief complaint of jaundice (03 Oct 2018 13:34)      PAST MEDICAL & SURGICAL HISTORY:  Bilateral sciatica  Arthritis  Gastric reflux  HIV (human immunodeficiency virus infection)  H/O inguinal hernia repair  H/O laminectomy      Current Nutrition Order: regular     PO Intake: Good (%) [   ]  Fair (50-75%) [  x ] Poor (<25%) [x   ]    GI Issues: no  noted n/v/d/c    Pain: abdominal pain noted at drain site    Skin Integrity: jaundiced    Labs:   10-03    135  |  97  |  11  ----------------------------<  249<H>  4.9   |  27  |  0.94    Ca    9.2      03 Oct 2018 05:50  Mg     2.1     10-03    TPro  5.6<L>  /  Alb  3.3  /  TBili  5.9<H>  /  DBili  x   /  AST  43<H>  /  ALT  75<H>  /  AlkPhos  496<H>  10-03    CAPILLARY BLOOD GLUCOSE      POCT Blood Glucose.: 271 mg/dL (03 Oct 2018 13:55)  POCT Blood Glucose.: 349 mg/dL (03 Oct 2018 12:05)  POCT Blood Glucose.: 158 mg/dL (03 Oct 2018 08:16)  POCT Blood Glucose.: 208 mg/dL (02 Oct 2018 21:17)  POCT Blood Glucose.: 146 mg/dL (02 Oct 2018 17:37)      Medications:  MEDICATIONS  (STANDING):  ALPRAZolam 1 milliGRAM(s) Oral every 24 hours  buPROPion XL . 150 milliGRAM(s) Oral every 24 hours  darunavir 800 mG/cobicstat 150 mG 1 Tablet(s) Oral every 24 hours  dextrose 5%. 1000 milliLiter(s) (50 mL/Hr) IV Continuous <Continuous>  dextrose 50% Injectable 12.5 Gram(s) IV Push once  dextrose 50% Injectable 25 Gram(s) IV Push once  dextrose 50% Injectable 25 Gram(s) IV Push once  emtricitabine 200 mG/tenofovir alafenamide 25 mG (DESCOVY) Tablet 1 Tablet(s) Oral every 24 hours  heparin  Injectable 5000 Unit(s) SubCutaneous every 8 hours  insulin glargine Injectable (LANTUS) 8 Unit(s) SubCutaneous every morning  insulin lispro (HumaLOG) corrective regimen sliding scale   SubCutaneous Before meals and at bedtime  insulin lispro Injectable (HumaLOG) 2 Unit(s) SubCutaneous three times a day before meals  oxyCODONE  ER Tablet 60 milliGRAM(s) Oral every 12 hours  sodium chloride 0.9%. 1000 milliLiter(s) (100 mL/Hr) IV Continuous <Continuous>    MEDICATIONS  (PRN):  dextrose 40% Gel 15 Gram(s) Oral once PRN Blood Glucose LESS THAN 70 milliGRAM(s)/deciliter  glucagon  Injectable 1 milliGRAM(s) IntraMuscular once PRN Glucose LESS THAN 70 milligrams/deciliter  HYDROmorphone   Tablet 8 milliGRAM(s) Oral every 6 hours PRN Moderate Pain (4 - 6)      Weight: 59kg (9/27), 50.4kg - some wt discrepancies upon admit - IBW used for calculations (78kg)    Weight Change: no wt change, please re-take wt     Estimated energy needs:   Calculations done using IBW as pt's current wt is <80% IBW. Needs calculated based on [facility] standards of care. Increased kcal and protein provided to pt to promote wt gain and combat wasting.    2346-2737kcal (30-35kcal/kg)   93-109g pro (1.2-1.4g pro)   1955-2346ml (30-35ml/kg)     Subjective:   Nutrition consult x2 for assessment. 57M admitted for yellowing of skin x4 days and abdominal discomfort, elevated liver enzymes S/P CT scan with results reporting mass on pancreas with associated jaundice. S/P ERCP 9/28 w/ sphincterotomy and biopsy. PMH: HIV, GERD, OA, b/l sciatic. Skin: no edema, intact, +yellow coloring. Reported UBW of 60.5kg x1 year ago with 3.6kg unintentional wt loss and reported current wt of 56.8kg, admit wt 59kg with new wt (9/27) 50.4kg (question wt accuracy, appreciate new wts taken)(unable to assess %wt loss). Diet advanced from full liquids to regular diet today. Pt reports chronic loss of appetite, was tolerating full liquids fairly, requested Ensure be added, concerned full liquid diet was not providing him with nutrients needed. Additional ERCP performed. ERCP w/stent failed x 2. Patient s/p percutaneous drain placed. Hem/onc consulted. Per medical team, patient will be able to empty his drain, no home care needed. Possible dc home today after hem/onc consult. Bilary drain with good output at this time.     Previous Nutrition Diagnosis: Malnutrition in setting of chronic illness r/t inadequate PO intake, non-existent appetite AEB <75% needs >1 month, clavicle, temporal, tricep wasting.     Active [x   ]  Resolved [   ]    If resolved, new PES:     Goal: consume >75% PO + supplement with good tolerance    Recommendations:  1. Add appetite stimulant, MVI  2. Add Ensure Enlive TID (1050 kcal, 60g protein, 540mL free H2O)   3. Trend wts 3x/week   4. Reinforce ed prn   5. Encourage intake, small meals through day to meet needs  6. Monitor and replete lytes prn     Discussed with team    Education: encouraged intake of supplement/ nutrient dense foods    Risk Level: High [ x  ] Moderate [   ] Low [   ]

## 2018-10-03 NOTE — PROGRESS NOTE ADULT - ASSESSMENT
57 year old man with past medical history of HIV (CD4 195, viral load undetectable), GERD, osteoarthiritis, and bilateral sciatic, presents w/ new onset pancreatic mass.    - Although definitive diagnosis pending results of FNA biopsy on EUS (10/2), given location of tumor, history and physical exam, and symptoms, patient likely has multifocal pancreatic cancer. Would therefore recommend consulting Dr. Wetzel (oncology); if multifocal pancreatic cancer, patient will need neoadjuvant chemotherapy.  - Would not recommend discharging patient given significant output from PTC drain  - Would keep PTC drain above liver  - Definitive management pending FNA biopsy results and discussion with Dr. Wetzel.  - Plan discussed with attending and chief resident.

## 2018-10-03 NOTE — PROGRESS NOTE ADULT - PROBLEM SELECTOR PLAN 5
- c/w home buproprion 150mg at night  - aprazalam 1mg q 24 hours Patient with extensive pain medication for arthritis   - c/w home medications to avoid withdrawal:  - hydromorphone 8mg q 6 hours prn for breakthrough  - oxycontin 60mg q 12 hours standing

## 2018-10-03 NOTE — PROGRESS NOTE ADULT - SUBJECTIVE AND OBJECTIVE BOX
57 M status post perc transhepatic biliary drainage catheter placement 10/2.  Seen during rounds.  Drain flushed easily with 5 cc NS. Draining yellowish bile.    Output in ml:  10/2 - 1500

## 2018-10-03 NOTE — PROGRESS NOTE ADULT - ASSESSMENT
57 M status post perc transhepatic biliary drainage catheter placement 10/2.  Will continue to follow.

## 2018-10-03 NOTE — PROGRESS NOTE ADULT - ASSESSMENT
58 YO M h/o HIV (CD4 195, VL UD), nutcracker esophagus, OA, B/L sciatica referred by his PMD for abnormal LFTs and CT scan.     # Abnormal LFTs 2/2 biliary obstruction 2/2 pancreatic mass  - s/p unsuccessful ERCP x 2 with subsequent internal/external biliary drain by IR  - s/p EUS showing an approximately 2 cm ill-defined mass in the uncinate and a 2 cm ill-defined cystic/solid mass in the pancreatic tail, and a nonspecific cyst in the left lobe of the liver s/p FNA of the uncinate and tail mass  - EUS findings are suggestive of multifocal pancreatic CA pending FNA pathology  - CT chest shows no e/o metastasis  - CEA normal and CA 19-9 elevated at 196  - LFTs downtrending  - Follow-up surgery  - Follow-up IR  - Monitor biliary drain output  - Oncology consult (Dr. Hatfield)  - Would continue with prophylactic antibiotics for cholangitis as pt is at increased risk of stent occlusion  - Monitor LFTs daily  - Pain control per primary team  - Upon discharge, pt to follow-up with Dr. Ricks in 2-3 weeks in the office for further evaluation and discussion regarding internalization +/- upsizing of biliary drain    Case d/w Dr. Ricks  GI will follow

## 2018-10-03 NOTE — PROGRESS NOTE ADULT - PROBLEM SELECTOR PLAN 2
Resolving. Elevated LFTs 2/2 biliary obstruction 2/2 pancreatic mass vs ampullary mass/stricture vs cholangiocarcinoma. LFTs downtrending.  - Plan as per above Resolving. Elevated LFTs 2/2 biliary obstruction 2/2 pancreatic mass vs ampullary mass/stricture vs cholangiocarcinoma. LFTs downtrending.  - Plan as per above  - Last day of flagyl/levaquin post ERCP prophylaxis

## 2018-10-03 NOTE — PROGRESS NOTE ADULT - PROBLEM SELECTOR PLAN 8
1) PCP Contacted on Admission: (Y/N) --> Name & Phone #: Dr. Sommers  2) Date of Contact with PCP:  3) PCP Contacted at Discharge: (Y/N)  4) Summary of Handoff Given to PCP:   5) Post-Discharge Appointment Date and Location: F: not necessary  E: replete as necessary  N: Full liquid diet

## 2018-10-03 NOTE — PROGRESS NOTE ADULT - SUBJECTIVE AND OBJECTIVE BOX
INTERVAL HPI/OVERNIGHT EVENTS:  Patient was seen and examined at bedside. No acute events overnight. Patient states he has loss of appetite, will aim to increase diet today as patient tolerates. No bowel movements past 4 days, will give miralax as patient eats a more solid diet. Also complaining of some abdominal pain at drain site. Denies f/c/n/v, chest pain, SOB.    VITAL SIGNS:  T(F): 99 (10-03-18 @ 05:34)  HR: 74 (10-03-18 @ 05:34)  BP: 104/61 (10-03-18 @ 05:34)  RR: 16 (10-03-18 @ 05:34)  SpO2: 94% (10-03-18 @ 05:34)  Wt(kg): --    PHYSICAL EXAM:    Constitutional: Thin appearing, WDWN, NAD, resting comfortably in bed  HEENT: PERRL, EOMI, sclera non-icteric, MMM  Respiratory: CTA b/l, good air entry b/l, no wheezing, no rhonchi, no rales  Cardiovascular: RRR, normal S1S2, no M/R/G  Gastrointestinal: soft, NTND, no masses palpable, normoactive BS x4, drain in place RUQ, dressing clean, wet, intact  Extremities: Warm, well perfused, 2+ DP and radial b/l  Neurological: AAOx3, CN II-XII Grossly intact  Skin: Normal temperature, warm, dry    MEDICATIONS  (STANDING):  ALPRAZolam 1 milliGRAM(s) Oral every 24 hours  buPROPion XL . 150 milliGRAM(s) Oral every 24 hours  darunavir 800 mG/cobicstat 150 mG 1 Tablet(s) Oral every 24 hours  dextrose 5%. 1000 milliLiter(s) (50 mL/Hr) IV Continuous <Continuous>  dextrose 50% Injectable 12.5 Gram(s) IV Push once  dextrose 50% Injectable 25 Gram(s) IV Push once  dextrose 50% Injectable 25 Gram(s) IV Push once  emtricitabine 200 mG/tenofovir alafenamide 25 mG (DESCOVY) Tablet 1 Tablet(s) Oral every 24 hours  heparin  Injectable 5000 Unit(s) SubCutaneous every 8 hours  insulin lispro (HumaLOG) corrective regimen sliding scale   SubCutaneous Before meals and at bedtime  levoFLOXacin  Tablet 500 milliGRAM(s) Oral every 24 hours  metroNIDAZOLE    Tablet 500 milliGRAM(s) Oral every 8 hours  oxyCODONE  ER Tablet 60 milliGRAM(s) Oral every 12 hours  sodium chloride 0.9%. 1000 milliLiter(s) (100 mL/Hr) IV Continuous <Continuous>    MEDICATIONS  (PRN):  dextrose 40% Gel 15 Gram(s) Oral once PRN Blood Glucose LESS THAN 70 milliGRAM(s)/deciliter  glucagon  Injectable 1 milliGRAM(s) IntraMuscular once PRN Glucose LESS THAN 70 milligrams/deciliter  HYDROmorphone   Tablet 8 milliGRAM(s) Oral every 6 hours PRN Moderate Pain (4 - 6)      Allergies    penicillins (Angioedema)    Intolerances        LABS:                        9.8    6.8   )-----------( 161      ( 03 Oct 2018 05:50 )             29.2     10-03    135  |  97  |  11  ----------------------------<  249<H>  4.9   |  27  |  0.94    Ca    9.2      03 Oct 2018 05:50  Mg     2.1     10-03    TPro  5.6<L>  /  Alb  3.3  /  TBili  5.9<H>  /  DBili  x   /  AST  43<H>  /  ALT  75<H>  /  AlkPhos  496<H>  10-03          RADIOLOGY & ADDITIONAL TESTS:  Reviewed

## 2018-10-03 NOTE — PROGRESS NOTE ADULT - ASSESSMENT
57 year old man with past medical history of HIV (CD4 195, viral load undetectable), GERD, osteoarthiritis, and bilateral sciatic presents to the ED for medical evaluation due to elevated liver enzymes noted by his PMD. S/p failed ERCP, and IR guided PTC.

## 2018-10-03 NOTE — PROGRESS NOTE ADULT - SUBJECTIVE AND OBJECTIVE BOX
SUBJECTIVE: PTC placed yesterday successfully; significant output of 1.5 L of bilious fluid overnight. Tbili trended down to 5.9 (9.6). LFTs unchanged. EUS (10/2) shows a 2 cm mass on pancreatic tail and uncinate process, pancreatic duct dilation to 4 mm, cystic lesion on left lob of liver, nonbleeding pyloric ulcer, erosive duodenopathy, gastropath in stomach antrum and gastric body.       darunavir 800 mG/cobicstat 150 mG 1 Tablet(s) Oral every 24 hours  emtricitabine 200 mG/tenofovir alafenamide 25 mG (DESCOVY) Tablet 1 Tablet(s) Oral every 24 hours  heparin  Injectable 5000 Unit(s) SubCutaneous every 8 hours  levoFLOXacin  Tablet 500 milliGRAM(s) Oral every 24 hours  metroNIDAZOLE    Tablet 500 milliGRAM(s) Oral every 8 hours      Vital Signs Last 24 Hrs  T(C): 37.2 (03 Oct 2018 05:34), Max: 37.5 (02 Oct 2018 18:36)  T(F): 99 (03 Oct 2018 05:34), Max: 99.5 (02 Oct 2018 18:36)  HR: 74 (03 Oct 2018 05:34) (69 - 77)  BP: 104/61 (03 Oct 2018 05:34) (104/61 - 131/86)  BP(mean): --  RR: 16 (03 Oct 2018 05:34) (16 - 16)  SpO2: 94% (03 Oct 2018 05:34) (94% - 96%)  I&O's Detail    02 Oct 2018 07:01  -  03 Oct 2018 07:00  --------------------------------------------------------  IN:    sodium chloride 0.9%.: 1200 mL  Total IN: 1200 mL    OUT:    Drain: 1500 mL  Total OUT: 1500 mL    Total NET: -300 mL      03 Oct 2018 07:01  -  03 Oct 2018 10:26  --------------------------------------------------------  IN:    sodium chloride 0.9%.: 100 mL  Total IN: 100 mL    OUT:  Total OUT: 0 mL    Total NET: 100 mL          General: NAD, cachectic, but improved jaundice and scleral icterus  C/V: NSR  Pulm: Nonlabored breathing, no respiratory distress  Abd: soft, NT/ND.  Extrem: WWP, no edema, SCDs in place        LABS:                        9.8    6.8   )-----------( 161      ( 03 Oct 2018 05:50 )             29.2     10-03    135  |  97  |  11  ----------------------------<  249<H>  4.9   |  27  |  0.94    Ca    9.2      03 Oct 2018 05:50  Mg     2.1     10-03    TPro  5.6<L>  /  Alb  3.3  /  TBili  5.9<H>  /  DBili  x   /  AST  43<H>  /  ALT  75<H>  /  AlkPhos  496<H>  10-03          RADIOLOGY & ADDITIONAL STUDIES:

## 2018-10-03 NOTE — PROGRESS NOTE ADULT - PROBLEM SELECTOR PLAN 1
Patient had a CT scan due to his abnormal labs and complaints w/ results reporting a 3cm x 2cm x 2cm mass on his pancreas w/ associated jaundice and abdominal cramping. S/p failed ERCPx2, unable to stent. CT chest 9/28 negative for metastatic disease. CA 19-9 elevated, CEA 3.4.  - F/u GI recs  - F/u surg recs  - trend LFTs  - F/u ampulla biopsy Patient had a CT scan due to his abnormal labs and complaints w/ results reporting a 3cm x 2cm x 2cm mass on his pancreas w/ associated jaundice and abdominal cramping. S/p failed ERCPx2, unable to stent. CT chest 9/28 negative for metastatic disease. CA 19-9 elevated, CEA 3.4.  - F/u GI recs  - F/u surg recs  - Heme onc (Novoselec) consulted, f/u recs  - trend LFTs  - F/u ampulla biopsy

## 2018-10-03 NOTE — PROGRESS NOTE ADULT - PROBLEM SELECTOR PLAN 6
heparin 5000q 8 hours, possible malignancy therefore intermediate risk for DVT - c/w home buproprion 150mg at night  - aprazalam 1mg q 24 hours

## 2018-10-04 DIAGNOSIS — K59.00 CONSTIPATION, UNSPECIFIED: ICD-10-CM

## 2018-10-04 LAB
ALBUMIN SERPL ELPH-MCNC: 3.3 G/DL — SIGNIFICANT CHANGE UP (ref 3.3–5)
ALP SERPL-CCNC: 413 U/L — HIGH (ref 40–120)
ALT FLD-CCNC: 54 U/L — HIGH (ref 10–45)
ANION GAP SERPL CALC-SCNC: 13 MMOL/L — SIGNIFICANT CHANGE UP (ref 5–17)
AST SERPL-CCNC: 36 U/L — SIGNIFICANT CHANGE UP (ref 10–40)
BILIRUB SERPL-MCNC: 4.4 MG/DL — HIGH (ref 0.2–1.2)
BUN SERPL-MCNC: 10 MG/DL — SIGNIFICANT CHANGE UP (ref 7–23)
CALCIUM SERPL-MCNC: 8.9 MG/DL — SIGNIFICANT CHANGE UP (ref 8.4–10.5)
CHLORIDE SERPL-SCNC: 98 MMOL/L — SIGNIFICANT CHANGE UP (ref 96–108)
CO2 SERPL-SCNC: 23 MMOL/L — SIGNIFICANT CHANGE UP (ref 22–31)
CREAT SERPL-MCNC: 0.88 MG/DL — SIGNIFICANT CHANGE UP (ref 0.5–1.3)
GLUCOSE BLDC GLUCOMTR-MCNC: 128 MG/DL — HIGH (ref 70–99)
GLUCOSE BLDC GLUCOMTR-MCNC: 143 MG/DL — HIGH (ref 70–99)
GLUCOSE BLDC GLUCOMTR-MCNC: 204 MG/DL — HIGH (ref 70–99)
GLUCOSE BLDC GLUCOMTR-MCNC: 332 MG/DL — HIGH (ref 70–99)
GLUCOSE SERPL-MCNC: 146 MG/DL — HIGH (ref 70–99)
HCT VFR BLD CALC: 27.5 % — LOW (ref 39–50)
HGB BLD-MCNC: 9.4 G/DL — LOW (ref 13–17)
MAGNESIUM SERPL-MCNC: 1.8 MG/DL — SIGNIFICANT CHANGE UP (ref 1.6–2.6)
MCHC RBC-ENTMCNC: 32.2 PG — SIGNIFICANT CHANGE UP (ref 27–34)
MCHC RBC-ENTMCNC: 34.2 G/DL — SIGNIFICANT CHANGE UP (ref 32–36)
MCV RBC AUTO: 94.2 FL — SIGNIFICANT CHANGE UP (ref 80–100)
NON-GYNECOLOGICAL CYTOLOGY STUDY: SIGNIFICANT CHANGE UP
NON-GYNECOLOGICAL CYTOLOGY STUDY: SIGNIFICANT CHANGE UP
PHOSPHATE SERPL-MCNC: 2 MG/DL — LOW (ref 2.5–4.5)
PLATELET # BLD AUTO: 122 K/UL — LOW (ref 150–400)
POTASSIUM SERPL-MCNC: 3.9 MMOL/L — SIGNIFICANT CHANGE UP (ref 3.5–5.3)
POTASSIUM SERPL-SCNC: 3.9 MMOL/L — SIGNIFICANT CHANGE UP (ref 3.5–5.3)
PROT SERPL-MCNC: 5.7 G/DL — LOW (ref 6–8.3)
RBC # BLD: 2.92 M/UL — LOW (ref 4.2–5.8)
RBC # FLD: 14.5 % — SIGNIFICANT CHANGE UP (ref 10.3–16.9)
SODIUM SERPL-SCNC: 134 MMOL/L — LOW (ref 135–145)
WBC # BLD: 4.8 K/UL — SIGNIFICANT CHANGE UP (ref 3.8–10.5)
WBC # FLD AUTO: 4.8 K/UL — SIGNIFICANT CHANGE UP (ref 3.8–10.5)

## 2018-10-04 PROCEDURE — 99233 SBSQ HOSP IP/OBS HIGH 50: CPT

## 2018-10-04 RX ORDER — INSULIN LISPRO 100/ML
3 VIAL (ML) SUBCUTANEOUS
Qty: 0 | Refills: 0 | Status: DISCONTINUED | OUTPATIENT
Start: 2018-10-04 | End: 2018-10-04

## 2018-10-04 RX ORDER — PNEUMOCOCCAL 13-VALENT CONJUGATE VACCINE 2.2; 2.2; 2.2; 2.2; 2.2; 4.4; 2.2; 2.2; 2.2; 2.2; 2.2; 2.2; 2.2 UG/.5ML; UG/.5ML; UG/.5ML; UG/.5ML; UG/.5ML; UG/.5ML; UG/.5ML; UG/.5ML; UG/.5ML; UG/.5ML; UG/.5ML; UG/.5ML; UG/.5ML
0.5 INJECTION, SUSPENSION INTRAMUSCULAR ONCE
Qty: 0 | Refills: 0 | Status: COMPLETED | OUTPATIENT
Start: 2018-10-04 | End: 2018-10-05

## 2018-10-04 RX ORDER — OXYCODONE HYDROCHLORIDE 5 MG/1
60 TABLET ORAL EVERY 12 HOURS
Qty: 0 | Refills: 0 | Status: DISCONTINUED | OUTPATIENT
Start: 2018-10-04 | End: 2018-10-06

## 2018-10-04 RX ORDER — INSULIN LISPRO 100/ML
8 VIAL (ML) SUBCUTANEOUS ONCE
Qty: 0 | Refills: 0 | Status: COMPLETED | OUTPATIENT
Start: 2018-10-04 | End: 2018-10-04

## 2018-10-04 RX ORDER — HYDROMORPHONE HYDROCHLORIDE 2 MG/ML
8 INJECTION INTRAMUSCULAR; INTRAVENOUS; SUBCUTANEOUS EVERY 6 HOURS
Qty: 0 | Refills: 0 | Status: DISCONTINUED | OUTPATIENT
Start: 2018-10-04 | End: 2018-10-06

## 2018-10-04 RX ORDER — METRONIDAZOLE 500 MG
500 TABLET ORAL EVERY 8 HOURS
Qty: 0 | Refills: 0 | Status: DISCONTINUED | OUTPATIENT
Start: 2018-10-04 | End: 2018-10-06

## 2018-10-04 RX ORDER — POLYETHYLENE GLYCOL 3350 17 G/17G
17 POWDER, FOR SOLUTION ORAL ONCE
Qty: 0 | Refills: 0 | Status: COMPLETED | OUTPATIENT
Start: 2018-10-04 | End: 2018-10-04

## 2018-10-04 RX ORDER — HAEMOPH B OLIGO CONJ-DIPHT CRM
0.5 VIAL (ML) INTRAMUSCULAR ONCE
Qty: 0 | Refills: 0 | Status: COMPLETED | OUTPATIENT
Start: 2018-10-04 | End: 2018-10-06

## 2018-10-04 RX ORDER — INSULIN GLARGINE 100 [IU]/ML
10 INJECTION, SOLUTION SUBCUTANEOUS AT BEDTIME
Qty: 0 | Refills: 0 | Status: DISCONTINUED | OUTPATIENT
Start: 2018-10-05 | End: 2018-10-06

## 2018-10-04 RX ORDER — ALPRAZOLAM 0.25 MG
1 TABLET ORAL AT BEDTIME
Qty: 0 | Refills: 0 | Status: DISCONTINUED | OUTPATIENT
Start: 2018-10-04 | End: 2018-10-06

## 2018-10-04 RX ADMIN — OXYCODONE HYDROCHLORIDE 60 MILLIGRAM(S): 5 TABLET ORAL at 06:56

## 2018-10-04 RX ADMIN — OXYCODONE HYDROCHLORIDE 60 MILLIGRAM(S): 5 TABLET ORAL at 17:43

## 2018-10-04 RX ADMIN — POLYETHYLENE GLYCOL 3350 17 GRAM(S): 17 POWDER, FOR SOLUTION ORAL at 13:17

## 2018-10-04 RX ADMIN — HYDROMORPHONE HYDROCHLORIDE 8 MILLIGRAM(S): 2 INJECTION INTRAMUSCULAR; INTRAVENOUS; SUBCUTANEOUS at 12:40

## 2018-10-04 RX ADMIN — Medication 500 MILLIGRAM(S): at 22:08

## 2018-10-04 RX ADMIN — Medication 500 MILLIGRAM(S): at 13:17

## 2018-10-04 RX ADMIN — HEPARIN SODIUM 5000 UNIT(S): 5000 INJECTION INTRAVENOUS; SUBCUTANEOUS at 06:56

## 2018-10-04 RX ADMIN — DARUNAVIR ETHANOLATE AND COBICISTAT 1 TABLET(S): 800; 150 TABLET, FILM COATED ORAL at 22:08

## 2018-10-04 RX ADMIN — OXYCODONE HYDROCHLORIDE 60 MILLIGRAM(S): 5 TABLET ORAL at 18:30

## 2018-10-04 RX ADMIN — OXYCODONE HYDROCHLORIDE 60 MILLIGRAM(S): 5 TABLET ORAL at 07:26

## 2018-10-04 RX ADMIN — BUPROPION HYDROCHLORIDE 150 MILLIGRAM(S): 150 TABLET, EXTENDED RELEASE ORAL at 22:08

## 2018-10-04 RX ADMIN — Medication 500 MILLIGRAM(S): at 07:25

## 2018-10-04 RX ADMIN — Medication 4: at 17:43

## 2018-10-04 RX ADMIN — Medication 1 MILLIGRAM(S): at 22:08

## 2018-10-04 RX ADMIN — HEPARIN SODIUM 5000 UNIT(S): 5000 INJECTION INTRAVENOUS; SUBCUTANEOUS at 13:17

## 2018-10-04 RX ADMIN — Medication 8 UNIT(S): at 17:43

## 2018-10-04 RX ADMIN — Medication 8: at 12:51

## 2018-10-04 RX ADMIN — EMTRICITABINE AND TENOFOVIR DISOPROXIL FUMARATE 1 TABLET(S): 200; 300 TABLET, FILM COATED ORAL at 22:08

## 2018-10-04 RX ADMIN — HYDROMORPHONE HYDROCHLORIDE 8 MILLIGRAM(S): 2 INJECTION INTRAMUSCULAR; INTRAVENOUS; SUBCUTANEOUS at 11:42

## 2018-10-04 RX ADMIN — Medication 3 UNIT(S): at 12:51

## 2018-10-04 NOTE — PROGRESS NOTE ADULT - SUBJECTIVE AND OBJECTIVE BOX
INTERVAL HPI/OVERNIGHT EVENTS:  Patient was seen and examined at bedside. No acute events overnight. Patient only active complaint is not having BM, miralax to be given this AM. Denies f/c/n/v, abd pain, chest pain and SOB.    VITAL SIGNS:  T(F): 98.2 (10-04-18 @ 05:07)  HR: 70 (10-04-18 @ 05:07)  BP: 113/68 (10-04-18 @ 05:07)  RR: 15 (10-04-18 @ 05:07)  SpO2: 96% (10-04-18 @ 05:07)  Wt(kg): --    PHYSICAL EXAM:    Constitutional: Thin appearing, WDWN, NAD, resting comfortably in bed  HEENT: PERRL, EOMI, sclera non-icteric, MMM  Respiratory: CTA b/l, good air entry b/l, no wheezing, no rhonchi, no rales  Cardiovascular: RRR, normal S1S2, no M/R/G  Gastrointestinal: soft, NTND, no masses palpable, normoactive BS x4, drain in place RUQ draining dark yellow/bilious fluid. Dressing clean and intact, placed by patient shoulders  Extremities: Warm, well perfused, 2+ DP and radial b/l  Neurological: AAOx3, CN II-XII Grossly intact  Skin: Normal temperature, warm, dry    MEDICATIONS  (STANDING):  ALPRAZolam 1 milliGRAM(s) Oral every 24 hours  buPROPion XL . 150 milliGRAM(s) Oral every 24 hours  darunavir 800 mG/cobicstat 150 mG 1 Tablet(s) Oral every 24 hours  dextrose 5%. 1000 milliLiter(s) (50 mL/Hr) IV Continuous <Continuous>  dextrose 50% Injectable 12.5 Gram(s) IV Push once  dextrose 50% Injectable 25 Gram(s) IV Push once  dextrose 50% Injectable 25 Gram(s) IV Push once  emtricitabine 200 mG/tenofovir alafenamide 25 mG (DESCOVY) Tablet 1 Tablet(s) Oral every 24 hours  heparin  Injectable 5000 Unit(s) SubCutaneous every 8 hours  insulin lispro (HumaLOG) corrective regimen sliding scale   SubCutaneous Before meals and at bedtime  insulin lispro Injectable (HumaLOG) 3 Unit(s) SubCutaneous three times a day before meals  levoFLOXacin  Tablet 500 milliGRAM(s) Oral every 24 hours  metroNIDAZOLE    Tablet 500 milliGRAM(s) Oral every 8 hours  oxyCODONE  ER Tablet 60 milliGRAM(s) Oral every 12 hours  polyethylene glycol 3350 17 Gram(s) Oral once    MEDICATIONS  (PRN):  dextrose 40% Gel 15 Gram(s) Oral once PRN Blood Glucose LESS THAN 70 milliGRAM(s)/deciliter  glucagon  Injectable 1 milliGRAM(s) IntraMuscular once PRN Glucose LESS THAN 70 milligrams/deciliter  HYDROmorphone   Tablet 8 milliGRAM(s) Oral every 6 hours PRN Moderate Pain (4 - 6)      Allergies    penicillins (Angioedema)    Intolerances        LABS:                        9.8    6.8   )-----------( 161      ( 03 Oct 2018 05:50 )             29.2     10-03    135  |  97  |  11  ----------------------------<  249<H>  4.9   |  27  |  0.94    Ca    9.2      03 Oct 2018 05:50  Mg     2.1     10-03    TPro  5.6<L>  /  Alb  3.3  /  TBili  5.9<H>  /  DBili  x   /  AST  43<H>  /  ALT  75<H>  /  AlkPhos  496<H>  10-03          RADIOLOGY & ADDITIONAL TESTS:  Reviewed

## 2018-10-04 NOTE — PROGRESS NOTE ADULT - SUBJECTIVE AND OBJECTIVE BOX
57 M status post perc transhepatic biliary drainage catheter placement 10/2.  Seen during rounds.  Drain flushed easily with 5 cc NS. Draining yellowish bile.    Output in ml:  10/3- 1250  10/2 - 1500

## 2018-10-04 NOTE — PROGRESS NOTE ADULT - PROBLEM SELECTOR PLAN 6
Patient states not BM since Friday, denies abd pain/discomfort  - C/w regular diet + ensure  - Miralax given 10/4, will assess

## 2018-10-04 NOTE — PROGRESS NOTE ADULT - SUBJECTIVE AND OBJECTIVE BOX
SUBJECTIVE:    Low grade fever of 100.1 F, now resolved. 450 mL output overnight from PTC drain. Answered questions by bedside.    darunavir 800 mG/cobicstat 150 mG 1 Tablet(s) Oral every 24 hours  emtricitabine 200 mG/tenofovir alafenamide 25 mG (DESCOVY) Tablet 1 Tablet(s) Oral every 24 hours  heparin  Injectable 5000 Unit(s) SubCutaneous every 8 hours  levoFLOXacin  Tablet 500 milliGRAM(s) Oral every 24 hours  metroNIDAZOLE    Tablet 500 milliGRAM(s) Oral every 8 hours      Vital Signs Last 24 Hrs  T(C): 36.8 (04 Oct 2018 05:07), Max: 37.8 (03 Oct 2018 22:00)  T(F): 98.2 (04 Oct 2018 05:07), Max: 100.1 (03 Oct 2018 22:00)  HR: 70 (04 Oct 2018 05:07) (70 - 74)  BP: 113/68 (04 Oct 2018 05:07) (107/63 - 124/73)  BP(mean): --  RR: 15 (04 Oct 2018 05:07) (15 - 16)  SpO2: 96% (04 Oct 2018 05:07) (94% - 96%)  I&O's Detail    03 Oct 2018 07:01  -  04 Oct 2018 07:00  --------------------------------------------------------  IN:    sodium chloride 0.9%: 1700 mL  Total IN: 1700 mL    OUT:    Drain: 1250 mL  Total OUT: 1250 mL    Total NET: 450 mL        General: NAD, cachectic, but improved jaundice and scleral icterus  C/V: NSR  Pulm: Nonlabored breathing, no respiratory distress  Abd: soft, NT/ND.  Extrem: WWP, no edema, SCDs in place        LABS:                        9.8    6.8   )-----------( 161      ( 03 Oct 2018 05:50 )             29.2     10-03    135  |  97  |  11  ----------------------------<  249<H>  4.9   |  27  |  0.94    Ca    9.2      03 Oct 2018 05:50  Mg     2.1     10-03    TPro  5.6<L>  /  Alb  3.3  /  TBili  5.9<H>  /  DBili  x   /  AST  43<H>  /  ALT  75<H>  /  AlkPhos  496<H>  10-03          RADIOLOGY & ADDITIONAL STUDIES:

## 2018-10-04 NOTE — PROGRESS NOTE ADULT - ASSESSMENT
57 year old man with past medical history of HIV (CD4 195, viral load undetectable), GERD, osteoarthiritis, and bilateral sciatic, presents w/ new onset pancreatic mass.    - FINAL PLAN PENDING    - Although definitive diagnosis pending results of FNA biopsy on EUS (10/2), given location of tumor, history and physical exam, and symptoms, patient likely has multifocal pancreatic cancer. Would therefore recommend consulting Dr. Wetzel (oncology); if multifocal pancreatic cancer, patient will need neoadjuvant chemotherapy.  - Would not recommend discharging patient yet given significant output from PTC drain  - Continue keeping PTC drain above liver  - Definitive management pending FNA biopsy results and discussion with Dr. Wetzel.  - F/u regarding oncologic plan (Dr. Wetzel)  - Plan discussed with attending and chief resident. 57 year old man with past medical history of HIV (CD4 195, viral load undetectable), GERD, osteoarthiritis, and bilateral sciatic, presents w/ new onset pancreatic mass.    - Although definitive diagnosis pending results of FNA biopsy on EUS (10/2), given location of tumor, history and physical exam, and symptoms, patient likely has multifocal pancreatic cancer. Would therefore recommend consulting Dr. Wetzel (oncology); if multifocal pancreatic cancer, patient will need neoadjuvant chemotherapy.  - Would not recommend discharging patient yet given significant output from PTC drain  - Continue keeping PTC drain above liver  - Definitive management pending FNA biopsy results and discussion with Dr. Wetzel.  - F/u regarding oncologic plan (Dr. Wetzel)  - Plan discussed with attending and chief resident.

## 2018-10-04 NOTE — PROGRESS NOTE ADULT - PROBLEM SELECTOR PLAN 2
Resolving. Elevated LFTs 2/2 biliary obstruction 2/2 pancreatic mass vs ampullary mass/stricture vs cholangiocarcinoma. LFTs downtrending.  - Plan as per above  - C/w flagyl/levaquin post ERCP prophylaxis

## 2018-10-04 NOTE — PROGRESS NOTE ADULT - PROBLEM SELECTOR PLAN 4
- C/w ISS  - C/w lantus 8 units QHS  - C/w lispro 2 units premeal - C/w ISS  - C/w lantus 10 units QHS  - C/w lispro 3 units premeal

## 2018-10-04 NOTE — PROGRESS NOTE ADULT - ASSESSMENT
57 year old man with past medical history of HIV (CD4 195, viral load undetectable), GERD, osteoarthiritis, and bilateral sciatic presents to the ED for medical evaluation due to elevated liver enzymes noted by his PMD. S/p failed ERCP, and IR guided PTC. EUS revealed [id number] and 27x20 mm lesions in pancreas w/ cystic and solid components. Dr. Hatfield (heme onc) consulted, patient to start on neoadjuvant chemotherapy once ampulla biopsy and FNA of pancreas results.

## 2018-10-04 NOTE — PROGRESS NOTE ADULT - ASSESSMENT
56 YO M h/o HIV (CD4 195, VL UD), nutcracker esophagus, OA, B/L sciatica referred by his PMD for abnormal LFTs and CT scan.     # Abnormal LFTs 2/2 biliary obstruction 2/2 pancreatic mass  - s/p unsuccessful ERCP x 2 with subsequent internal/external biliary drain by IR  - s/p EUS showing an approximately 2 cm ill-defined mass in the uncinate and a 2 cm ill-defined cystic/solid mass in the pancreatic tail, and a nonspecific cyst in the left lobe of the liver s/p FNA of the uncinate and tail mass  - EUS findings are suggestive of multifocal pancreatic CA pending FNA pathology  - CT chest shows no e/o metastasis  - CEA normal and CA 19-9 elevated at 196  - LFTs downtrending  - Follow-up surgery  - Monitor biliary drain output  - Oncology consulted  - Would continue with prophylactic antibiotics for cholangitis as pt is at increased risk of stent occlusion  - Monitor LFTs daily  - Pain control per primary team  - Upon discharge, pt to follow-up with Dr. Ricks in 2-3 weeks in the office for further evaluation and discussion regarding internalization +/- upsizing of biliary drain

## 2018-10-04 NOTE — PROGRESS NOTE ADULT - SUBJECTIVE AND OBJECTIVE BOX
Pt seen and examined at bedside.    PERTINENT REVIEW OF SYSTEMS:  CONSTITUTIONAL: No weakness, fevers or chills  HEENT: No visual changes; No vertigo or throat pain   GASTROINTESTINAL: No abdominal or epigastric pain. No nausea, vomiting, or hematemesis; No diarrhea or constipation. No melena or hematochezia.    Allergies    penicillins (Angioedema)    Intolerances      MEDICATIONS:  MEDICATIONS  (STANDING):  ALPRAZolam 1 milliGRAM(s) Oral at bedtime  buPROPion XL . 150 milliGRAM(s) Oral every 24 hours  darunavir 800 mG/cobicstat 150 mG 1 Tablet(s) Oral every 24 hours  dextrose 5%. 1000 milliLiter(s) (50 mL/Hr) IV Continuous <Continuous>  dextrose 50% Injectable 12.5 Gram(s) IV Push once  dextrose 50% Injectable 25 Gram(s) IV Push once  dextrose 50% Injectable 25 Gram(s) IV Push once  emtricitabine 200 mG/tenofovir alafenamide 25 mG (DESCOVY) Tablet 1 Tablet(s) Oral every 24 hours  haemophilus B conjugate Vaccine 0.5 milliLiter(s) IntraMuscular once  insulin lispro (HumaLOG) corrective regimen sliding scale   SubCutaneous Before meals and at bedtime  insulin lispro Injectable (HumaLOG) 8 Unit(s) SubCutaneous once  levoFLOXacin  Tablet 500 milliGRAM(s) Oral every 24 hours  metroNIDAZOLE    Tablet 500 milliGRAM(s) Oral every 8 hours  oxyCODONE  ER Tablet 60 milliGRAM(s) Oral every 12 hours  pneumococcal  13 Vaccine (PREVNAR 13) 0.5 milliLiter(s) IntraMuscular once    MEDICATIONS  (PRN):  dextrose 40% Gel 15 Gram(s) Oral once PRN Blood Glucose LESS THAN 70 milliGRAM(s)/deciliter  glucagon  Injectable 1 milliGRAM(s) IntraMuscular once PRN Glucose LESS THAN 70 milligrams/deciliter  HYDROmorphone   Tablet 8 milliGRAM(s) Oral every 6 hours PRN Moderate Pain (4 - 6)    Vital Signs Last 24 Hrs  T(C): 36.6 (04 Oct 2018 11:18), Max: 37.8 (03 Oct 2018 22:00)  T(F): 97.8 (04 Oct 2018 11:18), Max: 100.1 (03 Oct 2018 22:00)  HR: 68 (04 Oct 2018 11:18) (68 - 72)  BP: 107/65 (04 Oct 2018 11:18) (107/65 - 124/73)  BP(mean): --  RR: 14 (04 Oct 2018 11:18) (14 - 16)  SpO2: 96% (04 Oct 2018 11:18) (96% - 96%)    10-03 @ 07:01  -  10-04 @ 07:00  --------------------------------------------------------  IN: 1700 mL / OUT: 1250 mL / NET: 450 mL    10-04 @ 07:01  -  10-04 @ 17:19  --------------------------------------------------------  IN: 0 mL / OUT: 450 mL / NET: -450 mL      PHYSICAL EXAM:    General: no acute distress  HEENT: sclera citeric  Gastrointestinal: Soft non-tender non-distended; Normal bowel sounds; drain in situ  Skin: Warm and dry. No obvious rash    LABS:                        9.4    4.8   )-----------( 122      ( 04 Oct 2018 08:31 )             27.5     10-04    134<L>  |  98  |  10  ----------------------------<  146<H>  3.9   |  23  |  0.88    Ca    8.9      04 Oct 2018 08:31  Phos  2.0     10-04  Mg     1.8     10-04    TPro  5.7<L>  /  Alb  3.3  /  TBili  4.4<H>  /  DBili  x   /  AST  36  /  ALT  54<H>  /  AlkPhos  413<H>  10-04                      Culture - Body Fluid with Gram Stain (collected 02 Oct 2018 12:35)  Source: .Body Fluid Bile  Gram Stain (03 Oct 2018 09:48):    No organisms seen    Few White blood cells  Preliminary Report (03 Oct 2018 09:58):    No growth to date.      RADIOLOGY & ADDITIONAL STUDIES:

## 2018-10-04 NOTE — PROGRESS NOTE ADULT - PROBLEM SELECTOR PLAN 1
Patient had a CT scan due to his abnormal labs and complaints w/ results reporting a 3cm x 2cm x 2cm mass on his pancreas w/ associated jaundice and abdominal cramping. S/p failed ERCPx2, unable to stent. CT chest 9/28 negative for metastatic disease. CA 19-9 elevated, CEA 3.4. EUS revealed 20x20 mm and 11p47nt cystic/solid lesions from pancreas. FNA and ampulla biopsy path pending.  - F/u GI recs  - F/u surg recs  - F/u heme onc recs  - trend LFTs  - F/u ampulla biopsy  - F/u FNA

## 2018-10-04 NOTE — CONSULT NOTE ADULT - SUBJECTIVE AND OBJECTIVE BOX
GALINDO FERNANDEZ  MRN-[id number]    Chief complaint: Jaundice    HPI: 57 year old man with past medical history of HIV (CD4 195, viral load undetectable), GERD, osteoarthiritis, presented with abnormal LFT's and jaundice, outpatient scan showing pancreatic mass.   Now s/p EUS that showed multifocal pancreatic lesions. S/p FNA-pathology pending. S/p PTC drain placement by IR.    PAST MEDICAL & SURGICAL HISTORY:  Bilateral sciatica  Arthritis  Gastric reflux  HIV (human immunodeficiency virus infection)  H/O inguinal hernia repair  H/O laminectomy  No significant past surgical history      MEDICATIONS  (STANDING):  ALPRAZolam 1 milliGRAM(s) Oral every 24 hours  buPROPion XL . 150 milliGRAM(s) Oral every 24 hours  darunavir 800 mG/cobicstat 150 mG 1 Tablet(s) Oral every 24 hours  dextrose 5%. 1000 milliLiter(s) IV Continuous <Continuous>  dextrose 50% Injectable 12.5 Gram(s) IV Push once  dextrose 50% Injectable 25 Gram(s) IV Push once  dextrose 50% Injectable 25 Gram(s) IV Push once  emtricitabine 200 mG/tenofovir alafenamide 25 mG (DESCOVY) Tablet 1 Tablet(s) Oral every 24 hours  heparin  Injectable 5000 Unit(s) SubCutaneous every 8 hours  insulin lispro (HumaLOG) corrective regimen sliding scale   SubCutaneous Before meals and at bedtime  insulin lispro Injectable (HumaLOG) 3 Unit(s) SubCutaneous three times a day before meals  levoFLOXacin  Tablet 500 milliGRAM(s) Oral every 24 hours  metroNIDAZOLE    Tablet 500 milliGRAM(s) Oral every 8 hours  oxyCODONE  ER Tablet 60 milliGRAM(s) Oral every 12 hours  polyethylene glycol 3350 17 Gram(s) Oral once      Allergies    penicillins (Angioedema)    Intolerances          FAMILY HISTORY:  No pertinent family history in first degree relatives      ROS:  Jaundice, fatigue  + 15 Lb weight loss, + anorexia  No SOB or CP  No nausea or vomiting  Intermittent abdominal pain   No history of easy bruising/bleeding.  No leg pain or leg swelling.    ROS is otherwise negative.    Physical exam:    Vital signs  Vital Signs Last 24 Hrs  T(C): 36.8 (10-04-18 @ 05:07), Max: 37.8 (10-03-18 @ 22:00)  T(F): 98.2 (10-04-18 @ 05:07), Max: 100.1 (10-03-18 @ 22:00)  HR: 70 (10-04-18 @ 05:07) (70 - 74)  BP: 113/68 (10-04-18 @ 05:07) (107/63 - 124/73)  BP(mean): --  RR: 15 (10-04-18 @ 05:07) (15 - 16)  SpO2: 96% (10-04-18 @ 05:07) (94% - 96%)  thin, ill appearing man  HEENT: PERRLA, pink mucosae  No palpable lymphadenopathy  Cardiovascular: Regular rhythm/rate, no murmurs, rubs, gallops  Respiratory: clear to auscultation B/L  Abdomen: soft, non tender, non distended, no palpable masses, PTC drain in place  Extremities:  no peripheral edema  Skin: warm, no obvious lesions on visible skin    LABS:  CBC Full  -  ( 03 Oct 2018 05:50 )  WBC Count : 6.8 K/uL  Hemoglobin : 9.8 g/dL  Hematocrit : 29.2 %  Platelet Count - Automated : 161 K/uL  Mean Cell Volume : 94.5 fL  Mean Cell Hemoglobin : 31.7 pg  Mean Cell Hemoglobin Concentration : 33.6 g/dL  Auto Neutrophil # : x  Auto Lymphocyte # : x  Auto Monocyte # : x  Auto Eosinophil # : x  Auto Basophil # : x  Auto Neutrophil % : x  Auto Lymphocyte % : x  Auto Monocyte % : x  Auto Eosinophil % : x  Auto Basophil % : x    03 Oct 2018 05:50    135    |  97     |  11     ----------------------------<  249    4.9     |  27     |  0.94     Ca    9.2        03 Oct 2018 05:50  Mg     2.1       03 Oct 2018 05:50    TPro  5.6    /  Alb  3.3    /  TBili  5.9    /  DBili  x      /  AST  43     /  ALT  75     /  AlkPhos  496    03 Oct 2018 05:50      PERTINENT IMAGING STUDIES: reviewed    ASSESSMENT:  Biliary obstruction  Pancreatic mass    PLAN:    Clinically stable, improving since PTC placement  Await pathology  No obvious evidence of metastatic disease  The patient may benefit from neoadjuvant therapy once the diagnosis is confirmed  Will discuss with Dr. Hope      Thank you    Juvenal Wetzel MD  [phone]

## 2018-10-05 LAB
ALBUMIN SERPL ELPH-MCNC: 3.6 G/DL — SIGNIFICANT CHANGE UP (ref 3.3–5)
ALP SERPL-CCNC: 399 U/L — HIGH (ref 40–120)
ALT FLD-CCNC: 48 U/L — HIGH (ref 10–45)
ANION GAP SERPL CALC-SCNC: 11 MMOL/L — SIGNIFICANT CHANGE UP (ref 5–17)
APTT BLD: 31.1 SEC — SIGNIFICANT CHANGE UP (ref 27.5–37.4)
AST SERPL-CCNC: 28 U/L — SIGNIFICANT CHANGE UP (ref 10–40)
BILIRUB SERPL-MCNC: 4 MG/DL — HIGH (ref 0.2–1.2)
BUN SERPL-MCNC: 14 MG/DL — SIGNIFICANT CHANGE UP (ref 7–23)
CALCIUM SERPL-MCNC: 9.6 MG/DL — SIGNIFICANT CHANGE UP (ref 8.4–10.5)
CHLORIDE SERPL-SCNC: 96 MMOL/L — SIGNIFICANT CHANGE UP (ref 96–108)
CO2 SERPL-SCNC: 26 MMOL/L — SIGNIFICANT CHANGE UP (ref 22–31)
CREAT SERPL-MCNC: 0.98 MG/DL — SIGNIFICANT CHANGE UP (ref 0.5–1.3)
CULTURE RESULTS: NO GROWTH — SIGNIFICANT CHANGE UP
GLUCOSE BLDC GLUCOMTR-MCNC: 117 MG/DL — HIGH (ref 70–99)
GLUCOSE BLDC GLUCOMTR-MCNC: 147 MG/DL — HIGH (ref 70–99)
GLUCOSE BLDC GLUCOMTR-MCNC: 173 MG/DL — HIGH (ref 70–99)
GLUCOSE BLDC GLUCOMTR-MCNC: 281 MG/DL — HIGH (ref 70–99)
GLUCOSE BLDC GLUCOMTR-MCNC: 301 MG/DL — HIGH (ref 70–99)
GLUCOSE SERPL-MCNC: 145 MG/DL — HIGH (ref 70–99)
HCT VFR BLD CALC: 29.4 % — LOW (ref 39–50)
HGB BLD-MCNC: 9.8 G/DL — LOW (ref 13–17)
INR BLD: 1.33 — HIGH (ref 0.88–1.16)
MAGNESIUM SERPL-MCNC: 1.9 MG/DL — SIGNIFICANT CHANGE UP (ref 1.6–2.6)
MCHC RBC-ENTMCNC: 31.5 PG — SIGNIFICANT CHANGE UP (ref 27–34)
MCHC RBC-ENTMCNC: 33.3 G/DL — SIGNIFICANT CHANGE UP (ref 32–36)
MCV RBC AUTO: 94.5 FL — SIGNIFICANT CHANGE UP (ref 80–100)
PHOSPHATE SERPL-MCNC: 3.1 MG/DL — SIGNIFICANT CHANGE UP (ref 2.5–4.5)
PLATELET # BLD AUTO: 147 K/UL — LOW (ref 150–400)
POTASSIUM SERPL-MCNC: 4.1 MMOL/L — SIGNIFICANT CHANGE UP (ref 3.5–5.3)
POTASSIUM SERPL-SCNC: 4.1 MMOL/L — SIGNIFICANT CHANGE UP (ref 3.5–5.3)
PROT SERPL-MCNC: 6.1 G/DL — SIGNIFICANT CHANGE UP (ref 6–8.3)
PROTHROM AB SERPL-ACNC: 14.9 SEC — HIGH (ref 9.8–12.7)
RBC # BLD: 3.11 M/UL — LOW (ref 4.2–5.8)
RBC # FLD: 13.8 % — SIGNIFICANT CHANGE UP (ref 10.3–16.9)
SODIUM SERPL-SCNC: 133 MMOL/L — LOW (ref 135–145)
SPECIMEN SOURCE: SIGNIFICANT CHANGE UP
WBC # BLD: 7 K/UL — SIGNIFICANT CHANGE UP (ref 3.8–10.5)
WBC # FLD AUTO: 7 K/UL — SIGNIFICANT CHANGE UP (ref 3.8–10.5)

## 2018-10-05 PROCEDURE — 99239 HOSP IP/OBS DSCHRG MGMT >30: CPT

## 2018-10-05 PROCEDURE — 99152 MOD SED SAME PHYS/QHP 5/>YRS: CPT

## 2018-10-05 PROCEDURE — 36561 INSERT TUNNELED CV CATH: CPT | Mod: RT

## 2018-10-05 PROCEDURE — 76937 US GUIDE VASCULAR ACCESS: CPT | Mod: 26

## 2018-10-05 PROCEDURE — 77001 FLUOROGUIDE FOR VEIN DEVICE: CPT | Mod: 26

## 2018-10-05 RX ORDER — NEISSERIA MENINGITIDIS GROUP A CAPSULAR POLYSACCHARIDE TETANUS TOXOID CONJUGATE ANTIGEN, NEISSERIA MENINGITIDIS GROUP C CAPSULAR POLYSACCHARIDE TETANUS TOXOID CONJUGATE ANTIGEN, NEISSERIA MENINGITIDIS GROUP Y CAPSULAR POLYSACCHARIDE TETANUS TOXOID CONJUGATE ANTIGEN, AND NEISSERIA MENINGITIDIS GROUP W-135 CAPSULAR POLYSACCHARIDE TETANUS TOXOID CONJUGATE ANTIGEN 10; 10; 10; 10 UG/.5ML; UG/.5ML; UG/.5ML; UG/.5ML
0.5 INJECTION, SOLUTION INTRAMUSCULAR ONCE
Qty: 0 | Refills: 0 | Status: COMPLETED | OUTPATIENT
Start: 2018-10-05 | End: 2018-10-06

## 2018-10-05 RX ORDER — METRONIDAZOLE 500 MG
1 TABLET ORAL
Qty: 6 | Refills: 0 | OUTPATIENT
Start: 2018-10-05 | End: 2018-10-06

## 2018-10-05 RX ORDER — NEISSERIA MENINGITIDIS GROUP A CAPSULAR POLYSACCHARIDE ANTIGEN, NEISSERIA MENINGITIDIS GROUP C CAPSULAR POLYSACCHARIDE ANTIGEN, NEISSERIA MENINGITIDIS GROUP Y CAPSULAR POLYSACCHARIDE ANTIGEN AND NEISSERIA MENINGITIDIS GROUP W-135 CAPSULAR POLYSACCHARIDE ANTIGEN 50 MCG
0.5 KIT SUBCUTANEOUS ONCE
Qty: 0 | Refills: 0 | Status: DISCONTINUED | OUTPATIENT
Start: 2018-10-05 | End: 2018-10-05

## 2018-10-05 RX ADMIN — Medication 1 MILLIGRAM(S): at 23:07

## 2018-10-05 RX ADMIN — BUPROPION HYDROCHLORIDE 150 MILLIGRAM(S): 150 TABLET, EXTENDED RELEASE ORAL at 23:07

## 2018-10-05 RX ADMIN — OXYCODONE HYDROCHLORIDE 60 MILLIGRAM(S): 5 TABLET ORAL at 18:35

## 2018-10-05 RX ADMIN — Medication 8: at 23:18

## 2018-10-05 RX ADMIN — DARUNAVIR ETHANOLATE AND COBICISTAT 1 TABLET(S): 800; 150 TABLET, FILM COATED ORAL at 23:07

## 2018-10-05 RX ADMIN — Medication 500 MILLIGRAM(S): at 23:07

## 2018-10-05 RX ADMIN — HYDROMORPHONE HYDROCHLORIDE 8 MILLIGRAM(S): 2 INJECTION INTRAMUSCULAR; INTRAVENOUS; SUBCUTANEOUS at 14:26

## 2018-10-05 RX ADMIN — Medication 500 MILLIGRAM(S): at 13:33

## 2018-10-05 RX ADMIN — Medication 500 MILLIGRAM(S): at 06:09

## 2018-10-05 RX ADMIN — EMTRICITABINE AND TENOFOVIR DISOPROXIL FUMARATE 1 TABLET(S): 200; 300 TABLET, FILM COATED ORAL at 23:07

## 2018-10-05 RX ADMIN — OXYCODONE HYDROCHLORIDE 60 MILLIGRAM(S): 5 TABLET ORAL at 06:09

## 2018-10-05 RX ADMIN — HYDROMORPHONE HYDROCHLORIDE 8 MILLIGRAM(S): 2 INJECTION INTRAMUSCULAR; INTRAVENOUS; SUBCUTANEOUS at 13:40

## 2018-10-05 RX ADMIN — INSULIN GLARGINE 10 UNIT(S): 100 INJECTION, SOLUTION SUBCUTANEOUS at 23:18

## 2018-10-05 RX ADMIN — Medication 2: at 09:17

## 2018-10-05 RX ADMIN — OXYCODONE HYDROCHLORIDE 60 MILLIGRAM(S): 5 TABLET ORAL at 19:18

## 2018-10-05 RX ADMIN — PNEUMOCOCCAL 13-VALENT CONJUGATE VACCINE 0.5 MILLILITER(S): 2.2; 2.2; 2.2; 2.2; 2.2; 4.4; 2.2; 2.2; 2.2; 2.2; 2.2; 2.2; 2.2 INJECTION, SUSPENSION INTRAMUSCULAR at 20:00

## 2018-10-05 NOTE — PROGRESS NOTE ADULT - PROBLEM SELECTOR PROBLEM 1
Pancreatic mass

## 2018-10-05 NOTE — DISCHARGE NOTE ADULT - MEDICATION SUMMARY - MEDICATIONS TO TAKE
I will START or STAY ON the medications listed below when I get home from the hospital:    metroNIDAZOLE 500 mg oral tablet  -- 1 tab(s) by mouth every 8 hours  -- Indication: For Infection prophylaxis    HYDROmorphone 8 mg oral tablet  -- 1 tab(s) by mouth every 6 hours, As Needed  -- Indication: For Arthritis    OxyCONTIN 60 mg oral tablet, extended release  -- 1 tab(s) by mouth every 12 hours  -- Indication: For Arthritis    Descovy 200 mg-25 mg oral tablet  -- 1 tab(s) by mouth once a day  -- Indication: For HIV (human immunodeficiency virus infection)    Prezcobix 800 mg-150 mg oral tablet  -- 1 tab(s) by mouth once a day  -- Indication: For HIV (human immunodeficiency virus infection)    Xanax 1 mg oral tablet  -- 1 cap(s) by mouth once a day (at bedtime)  -- Indication: For Anxiety    levoFLOXacin 500 mg oral tablet  -- 1 tab(s) by mouth every 24 hours  -- Indication: For Infection prophylaxis    buPROPion 150 mg/24 hours (XL) oral tablet, extended release  -- 1 tab(s) by mouth every 24 hours  -- Indication: For Depression

## 2018-10-05 NOTE — DISCHARGE NOTE ADULT - ADDITIONAL INSTRUCTIONS
Please follow-up with Dr. Ricks (gastroenterology) at his office in 3 weeks if you decide to not opt for surgery and are considering another ERCP/stent exchange - please calll to discuss/schedule this at your own convenience. Please follow-up with Dr. Ricks (gastroenterology) at his office in 3 weeks if you decide to not opt for surgery and are considering another ERCP/stent exchange - please calll to discuss/schedule this at your own convenience. Please follow-up with Dr. Lorna Hope at her [place] office at: [address] [place] [address], your appointment is scheduled at 10/12/2018 at 11:00 AM. Please call the office if you wish to reschedule. Please follow-up with Dr. Wetzel on 10/15/2018 at 3:30 PM, his address is below.

## 2018-10-05 NOTE — PROGRESS NOTE ADULT - ASSESSMENT
57 year old man with past medical history of HIV (CD4 195, viral load undetectable), GERD, osteoarthiritis, and bilateral sciatic presents to the ED for medical evaluation due to elevated liver enzymes noted by his PMD. S/p failed ERCP, and IR guided PTC. EUS revealed [id number] and 27x20 mm lesions in pancreas w/ cystic and solid components. Dr. Hatfield (heme onc) consulted, patient to start on neoadjuvant chemotherapy once ampulla biopsy and FNA of pancreas results. 57 year old man with past medical history of HIV (CD4 195, viral load undetectable), GERD, osteoarthiritis, and bilateral sciatic presents to the ED for medical evaluation due to elevated liver enzymes noted by his PMD. S/p failed ERCP, and IR guided PTC. EUS revealed [id number] and 27x20 mm lesions in pancreas w/ cystic and solid components. Dr. Hatfield (heme onc) consulted, patient to start on neoadjuvant chemotherapy once ampulla biopsy and FNA of pancreas results. Pending port placement.

## 2018-10-05 NOTE — DISCHARGE NOTE ADULT - VISION (WITH CORRECTIVE LENSES IF THE PATIENT USUALLY WEARS THEM):
wears reading eyeglasses/Normal vision: sees adequately in most situations; can see medication labels, newsprint

## 2018-10-05 NOTE — PROGRESS NOTE ADULT - ASSESSMENT
57 year old man with past medical history of HIV (CD4 195, viral load undetectable), GERD, osteoarthiritis, and bilateral sciatic, presents w/ new onset pancreatic mass.    - FNA biopsy of pancreatic head and tail mass are positive for adenocarcinoma.   - Discuss with Dr. Wetzel regarding pathology results to follow up regarding oncologic plan (neoadjuvant chemotherapy).  - Medport placement by IR today.  - Please administer post-splenectomy vaccines today.  - Patient should see Dr. Lorna Hope in her office this next Friday 10/12/18 (please call [phone] to confirm appointment).  - Patient should try clamping PTC intermittently at home to increase biliary absorption. If any signs of jaundice, patient should unclamp PTC. If any signs of cholangitis, fever >101 F, or draining around PTC patient should unclamp PTC and immediately return to ED.  - Plan discussed with attending.

## 2018-10-05 NOTE — DISCHARGE NOTE ADULT - NS AS ACTIVITY OBS
Walking-Indoors allowed/Please advance your activity as tolerated./Bathing allowed/Showering allowed

## 2018-10-05 NOTE — DISCHARGE NOTE ADULT - CARE PLAN
Principal Discharge DX:	Pancreatic adenocarcinoma  Goal:	Limit disease progression, and symptom management.  Assessment and plan of treatment:	You were found to have two 2x2 cm lesions noted in your pancreas. There were two failed ERCP procedures, but an ampulla biopsy was obtained, as well as a percutaneous cholecystostomy tube placed that is draining bile. Fine needle aspiration biopsies taken during your ultrasound is positive for pancreatic adenocarcinoma. Pl  Secondary Diagnosis:	HIV (human immunodeficiency virus infection)  Goal:	Limit disease progression, and symptom management.  Secondary Diagnosis:	Arthritis  Goal:	Limit disease progression, and symptom management. Principal Discharge DX:	Pancreatic adenocarcinoma  Goal:	Limit disease progression, and symptom management.  Assessment and plan of treatment:	You were found to have two 2x2 cm lesions noted in your pancreas. There were two failed ERCP procedures, but an ampulla biopsy was obtained, as well as a percutaneous cholecystostomy tube placed that is draining bile. Fine needle aspiration biopsies taken during your ultrasound is positive for pancreatic adenocarcinoma. Please follow-up with Dr. Hope and Dr. Wetzel at your appoints below. As for your drain, you can intermittently clamp it at home to increase biliary absorption. If any you notice any signs of jaundice (yellowing of the skin), you should unclamp the drain. If you start experiencing severe abdominal pain, and a temperature >101 F, or leakage around the drain site, please return tot he emergency department immediately. You will continue to take the antibiotics levofloxacin 500 mg daily and metronidazole 500 mg every 8 hours for 2 more days.  Secondary Diagnosis:	HIV (human immunodeficiency virus infection)  Goal:	Limit disease progression, and symptom management.  Assessment and plan of treatment:	Please continue taking your HIV medications, Prezcobix (darunavir 800 mg / cobicstat 150 mg) and Descovy (emtricitabine 200 mg/tenofovir alafenamide 25 mg) daily. Please follow-up with your primary care physician at your convenience.  Secondary Diagnosis:	Arthritis  Goal:	Limit disease progression, and symptom management.  Assessment and plan of treatment:	You were noted to have arthritis in your medical history. Please continue to take your Oxycodone ER 60 mg every 12 hours and hydromorphone 8 mg every 6 hours as needed. Please follow-up with your primary care physician at your convenience. Principal Discharge DX:	Pancreatic adenocarcinoma  Goal:	Limit disease progression, and symptom management.  Assessment and plan of treatment:	You were found to have two 2x2 cm lesions noted in your pancreas. There were two failed ERCP procedures, but an ampulla biopsy was obtained, as well as a percutaneous cholecystostomy tube placed that is draining bile. Fine needle aspiration biopsies taken during your ultrasound is positive for pancreatic adenocarcinoma. Please follow-up with Dr. Hope and Dr. Wetzel at your appoints below. As for your drain, you can intermittently clamp it at home to increase biliary absorption. If any you notice any signs of jaundice (yellowing of the skin), you should unclamp the drain. If you start experiencing severe abdominal pain, and a temperature >101 F, or leakage around the drain site, please return tot he emergency department immediately. You will continue to take the antibiotics levofloxacin 500 mg daily and metronidazole 500 mg every 8 hours for 2 more days. Please follow-up with your primary care physician for meningococcal vaccines for post-splenectomy management (you received haemophilus and pneumococcal here).  Secondary Diagnosis:	HIV (human immunodeficiency virus infection)  Goal:	Limit disease progression, and symptom management.  Assessment and plan of treatment:	Please continue taking your HIV medications, Prezcobix (darunavir 800 mg / cobicstat 150 mg) and Descovy (emtricitabine 200 mg/tenofovir alafenamide 25 mg) daily. Please follow-up with your primary care physician at your convenience.  Secondary Diagnosis:	Arthritis  Goal:	Limit disease progression, and symptom management.  Assessment and plan of treatment:	You were noted to have arthritis in your medical history. Please continue to take your Oxycodone ER 60 mg every 12 hours and hydromorphone 8 mg every 6 hours as needed. Please follow-up with your primary care physician at your convenience.

## 2018-10-05 NOTE — DISCHARGE NOTE ADULT - PATIENT PORTAL LINK FT
You can access the [email or web address] Patient Portal, offered by [facility], by registering with the following website: [email or web address]

## 2018-10-05 NOTE — PROGRESS NOTE ADULT - ASSESSMENT
56 YO M h/o HIV (CD4 195, VL UD), nutcracker esophagus, OA, B/L sciatica referred by his PMD for abnormal LFTs and CT scan.     # Abnormal LFTs 2/2 biliary obstruction 2/2 pancreatic mass  - s/p unsuccessful ERCP x 2 with subsequent internal/external biliary drain by IR  - s/p EUS showing an approximately 2 cm ill-defined mass in the uncinate and a 2 cm ill-defined cystic/solid mass in the pancreatic tail, and a nonspecific cyst in the left lobe of the liver s/p FNA of the uncinate and tail mass  - EUS findings are suggestive of multifocal pancreatic CA with FNA consistent with adenocarcinoma  - LFTs downtrending  - Appreciate surgery and onc recs, pt to follow-up with Dr. Hope and Dr. Hatfield as outpatient to discuss surgery and neoadjuvant chemotherapy  - Pt to follow-up in 3 weeks with Dr. Ricks, if pt opts to avoid surgery can consider repeat ERCP with stent exchange  - Pt educated on signs and symptoms of cholangitis, i.e. fever, chills, abdominal pain, jaundice, and instructed to call the office or present to the ED if these symptoms arise  - Continue empiric abx for additional 2 days  - Pain control per primary team    Case d/w Dr. Ricks

## 2018-10-05 NOTE — DISCHARGE NOTE ADULT - CARE PROVIDERS DIRECT ADDRESSES
[email or web address],DirectAddress_Unknown,DirectAddress_Unknown

## 2018-10-05 NOTE — PROGRESS NOTE ADULT - REASON FOR ADMISSION
jaundice

## 2018-10-05 NOTE — PROGRESS NOTE ADULT - ATTENDING COMMENTS
Patient seen and examined.  Pancreatic tail mass identified on CT scan but increased LFTs/jaundice concerning for ampullary/pancreatic head process. EUS showed pancreatic head mass in addition to tail mass.   elevated.  Pathology from EUS pending.    Overall improving from jaundice standpoint following PTC drainage. PTC output quite high (>1L per day). Recommend having bag above level of liver to try and promote antegrade flow. When LFTs closer to normal, can try clamping PTC intermittently to increase biliary absorption.    From pancreatic standpoint, I am concerned that this may represent multifocal pancreatic cancer. Awaiting biopsy results, but I did review that if this is the case, neoadjuvant chemotherapy would be warranted followed by surgical resection, which would include a total pancreatectomy. This would be challenging to recover from, as it would make him diabetic and insulin dependent as well as dependent on pancreatic enzyme replacement. Mr. Alejandro is in understanding. Will involve Juvenal Wetzel from medical oncology to review systemic options pending final pathology results.
DX  OBSTRUCTIVE JAUNDICE -  s/p perc biliary drain.   failed to cannulate cbd x 2 via ERCP. for EUS today  PANCREATIC MASS - will need bx. await results of EUS   HIV - cont ARTs  CHRONIC PAIN - on high dose opioids.
pt is asx  no abd pain/n/v/f/c    underwent ERCP today, but unable to stent CBD. will need re-eval monday  trend LFTs  surgery consulted  ct chest (non con) w/o evidence of metastases  cont ARTs  on opiods (chronically) for arthrits  no SI/HI currently. cont bupropiron for depression
DX  PANCREATIC CANCER - med onc/surg onc following. will need outpt follow up  HEPATIC CHOLESTASIS /BILIARY OBSTRUCTION - post biliary drain . trend LFTs  HIV - cont ARTs  HYPERGLYCEMIA - possibly due to pancr cancer. a1c 5. on lantus + ssi.
Pt seen and examined by me at bedside earlier in AM. Agree with housestaff's exam/a/p as noted above with additions,   no complaints this AM.   VSS, exam as above   labs reviewed alk phos/TB downtrending     a/p:  1. Obstructive jaundice s/p biopsy +adenocarcinoma, s/p PTC-appreciate heme/onc; surgery recs. Will get chemoport today with outpatient f/u  2. HIV-c/w home meds    rest of a/p as above.

## 2018-10-05 NOTE — PROGRESS NOTE ADULT - PROVIDER SPECIALTY LIST ADULT
Gastroenterology
Heme/Onc
Internal Medicine
Intervent Radiology
Surgery
Gastroenterology
Internal Medicine
Internal Medicine

## 2018-10-05 NOTE — DISCHARGE NOTE ADULT - CARE PROVIDER_API CALL
Alberto Ricks), Gastroenterology  [address]  [address]  [place], [place] [address]  Phone: [phone]  Fax: [phone] Alberto Ricks), Gastroenterology  [address]  [address] [place] [address]  Phone: [phone]  Fax: [phone]    Lorna Hope), Surgery; Surgical Oncology  [address]  [address] [place] [address]  Phone: [phone]  Fax: [phone]    Juvenal Wetzel), Hematology; Medical Oncology  [address]  [address]  [place] [address]  Phone: [phone]  Fax: [phone]

## 2018-10-05 NOTE — PROGRESS NOTE ADULT - PROBLEM SELECTOR PROBLEM 3
HIV (human immunodeficiency virus infection)
Arthritis
HIV (human immunodeficiency virus infection)
HIV (human immunodeficiency virus infection)

## 2018-10-05 NOTE — PROGRESS NOTE ADULT - SUBJECTIVE AND OBJECTIVE BOX
INTERVAL HPI/OVERNIGHT EVENTS:  Patient was seen and examined at bedside. No acute overnight events. Patient NPO after midnight for mediport placement by IR. No active complaints. +2 BM overnight, well-formed.    VITAL SIGNS:  T(F): 98.2 (10-05-18 @ 05:38)  HR: 72 (10-05-18 @ 05:38)  BP: 131/71 (10-05-18 @ 05:38)  RR: 16 (10-05-18 @ 05:38)  SpO2: 98% (10-05-18 @ 05:38)  Wt(kg): --    PHYSICAL EXAM:    Constitutional: Thin appearing, WDWN, NAD, resting comfortably in bed  HEENT: PERRL, EOMI, sclera non-icteric, MMM  Respiratory: CTA b/l, good air entry b/l, no wheezing, no rhonchi, no rales  Cardiovascular: RRR, normal S1S2, no M/R/G  Gastrointestinal: soft, NTND, no masses palpable, normoactive BS x4, drain in place RUQ draining dark yellow/bilious fluid. Dressing clean and intact, placed by patient shoulders  Extremities: Warm, well perfused, 2+ DP and radial b/l  Neurological: AAOx3, CN II-XII Grossly intact  Skin: Normal temperature, warm, dry    MEDICATIONS  (STANDING):  ALPRAZolam 1 milliGRAM(s) Oral at bedtime  buPROPion XL . 150 milliGRAM(s) Oral every 24 hours  darunavir 800 mG/cobicstat 150 mG 1 Tablet(s) Oral every 24 hours  dextrose 5%. 1000 milliLiter(s) (50 mL/Hr) IV Continuous <Continuous>  dextrose 50% Injectable 12.5 Gram(s) IV Push once  dextrose 50% Injectable 25 Gram(s) IV Push once  dextrose 50% Injectable 25 Gram(s) IV Push once  emtricitabine 200 mG/tenofovir alafenamide 25 mG (DESCOVY) Tablet 1 Tablet(s) Oral every 24 hours  haemophilus B conjugate Vaccine 0.5 milliLiter(s) IntraMuscular once  insulin glargine Injectable (LANTUS) 10 Unit(s) SubCutaneous at bedtime  insulin lispro (HumaLOG) corrective regimen sliding scale   SubCutaneous Before meals and at bedtime  levoFLOXacin  Tablet 500 milliGRAM(s) Oral every 24 hours  metroNIDAZOLE    Tablet 500 milliGRAM(s) Oral every 8 hours  oxyCODONE  ER Tablet 60 milliGRAM(s) Oral every 12 hours  pneumococcal  13 Vaccine (PREVNAR 13) 0.5 milliLiter(s) IntraMuscular once    MEDICATIONS  (PRN):  dextrose 40% Gel 15 Gram(s) Oral once PRN Blood Glucose LESS THAN 70 milliGRAM(s)/deciliter  glucagon  Injectable 1 milliGRAM(s) IntraMuscular once PRN Glucose LESS THAN 70 milligrams/deciliter  HYDROmorphone   Tablet 8 milliGRAM(s) Oral every 6 hours PRN Moderate Pain (4 - 6)      Allergies    penicillins (Angioedema)    Intolerances        LABS:                        9.8    7.0   )-----------( 147      ( 05 Oct 2018 05:44 )             29.4     10-05    133<L>  |  96  |  14  ----------------------------<  145<H>  4.1   |  26  |  0.98    Ca    9.6      05 Oct 2018 05:44  Phos  3.1     10-05  Mg     1.9     10-05    TPro  6.1  /  Alb  3.6  /  TBili  4.0<H>  /  DBili  x   /  AST  28  /  ALT  48<H>  /  AlkPhos  399<H>  10-05    PT/INR - ( 05 Oct 2018 05:44 )   PT: 14.9 sec;   INR: 1.33          PTT - ( 05 Oct 2018 05:44 )  PTT:31.1 sec      RADIOLOGY & ADDITIONAL TESTS:  Reviewed INTERVAL HPI/OVERNIGHT EVENTS:  Patient was seen and examined at bedside. No acute overnight events. Patient NPO after midnight for mediport placement by IR. Complaining of positional abdominal pain near drain site this AM, worse when lying L lateral decubitus, resolves when patient is supine. +2 BM overnight, well-formed. Denies f/c/n/v, CP, SOB.    VITAL SIGNS:  T(F): 98.2 (10-05-18 @ 05:38)  HR: 72 (10-05-18 @ 05:38)  BP: 131/71 (10-05-18 @ 05:38)  RR: 16 (10-05-18 @ 05:38)  SpO2: 98% (10-05-18 @ 05:38)  Wt(kg): --    PHYSICAL EXAM:    Constitutional: Thin appearing, WDWN, NAD, resting comfortably in bed  HEENT: PERRL, EOMI, sclera non-icteric, MMM  Respiratory: CTA b/l, good air entry b/l, no wheezing, no rhonchi, no rales  Cardiovascular: RRR, normal S1S2, no M/R/G  Gastrointestinal: soft, NTND, no masses palpable, normoactive BS x4, drain in place RUQ draining dark yellow/bilious fluid,. Dressing clean, dry and intact, placed by patient R shoulder, some bile leaking from bag  Extremities: Warm, well perfused, 2+ DP and radial b/l  Neurological: AAOx3, CN II-XII Grossly intact  Skin: Normal temperature, warm, dry    MEDICATIONS  (STANDING):  ALPRAZolam 1 milliGRAM(s) Oral at bedtime  buPROPion XL . 150 milliGRAM(s) Oral every 24 hours  darunavir 800 mG/cobicstat 150 mG 1 Tablet(s) Oral every 24 hours  dextrose 5%. 1000 milliLiter(s) (50 mL/Hr) IV Continuous <Continuous>  dextrose 50% Injectable 12.5 Gram(s) IV Push once  dextrose 50% Injectable 25 Gram(s) IV Push once  dextrose 50% Injectable 25 Gram(s) IV Push once  emtricitabine 200 mG/tenofovir alafenamide 25 mG (DESCOVY) Tablet 1 Tablet(s) Oral every 24 hours  haemophilus B conjugate Vaccine 0.5 milliLiter(s) IntraMuscular once  insulin glargine Injectable (LANTUS) 10 Unit(s) SubCutaneous at bedtime  insulin lispro (HumaLOG) corrective regimen sliding scale   SubCutaneous Before meals and at bedtime  levoFLOXacin  Tablet 500 milliGRAM(s) Oral every 24 hours  metroNIDAZOLE    Tablet 500 milliGRAM(s) Oral every 8 hours  oxyCODONE  ER Tablet 60 milliGRAM(s) Oral every 12 hours  pneumococcal  13 Vaccine (PREVNAR 13) 0.5 milliLiter(s) IntraMuscular once    MEDICATIONS  (PRN):  dextrose 40% Gel 15 Gram(s) Oral once PRN Blood Glucose LESS THAN 70 milliGRAM(s)/deciliter  glucagon  Injectable 1 milliGRAM(s) IntraMuscular once PRN Glucose LESS THAN 70 milligrams/deciliter  HYDROmorphone   Tablet 8 milliGRAM(s) Oral every 6 hours PRN Moderate Pain (4 - 6)      Allergies    penicillins (Angioedema)    Intolerances        LABS:                        9.8    7.0   )-----------( 147      ( 05 Oct 2018 05:44 )             29.4     10-05    133<L>  |  96  |  14  ----------------------------<  145<H>  4.1   |  26  |  0.98    Ca    9.6      05 Oct 2018 05:44  Phos  3.1     10-05  Mg     1.9     10-05    TPro  6.1  /  Alb  3.6  /  TBili  4.0<H>  /  DBili  x   /  AST  28  /  ALT  48<H>  /  AlkPhos  399<H>  10-05    PT/INR - ( 05 Oct 2018 05:44 )   PT: 14.9 sec;   INR: 1.33          PTT - ( 05 Oct 2018 05:44 )  PTT:31.1 sec      RADIOLOGY & ADDITIONAL TESTS:  Reviewed

## 2018-10-05 NOTE — PROGRESS NOTE ADULT - PROBLEM SELECTOR PROBLEM 9
Nutrition, metabolism, and development symptoms
Nutrition, metabolism, and development symptoms
Transition of care performed with sharing of clinical summary

## 2018-10-05 NOTE — PROGRESS NOTE ADULT - SUBJECTIVE AND OBJECTIVE BOX
57 M status post perc transhepatic biliary drainage catheter placement 10/2.  Seen during rounds.  Drain flushed easily with 5 cc NS. Draining yellowish bile.    Output in ml:  10/5 - 1000  10/4- 1250  10/3 - 1500

## 2018-10-05 NOTE — DISCHARGE NOTE ADULT - PLAN OF CARE
Limit disease progression, and symptom management. You were found to have two 2x2 cm lesions noted in your pancreas. There were two failed ERCP procedures, but an ampulla biopsy was obtained, as well as a percutaneous cholecystostomy tube placed that is draining bile. Fine needle aspiration biopsies taken during your ultrasound is positive for pancreatic adenocarcinoma. Pl You were found to have two 2x2 cm lesions noted in your pancreas. There were two failed ERCP procedures, but an ampulla biopsy was obtained, as well as a percutaneous cholecystostomy tube placed that is draining bile. Fine needle aspiration biopsies taken during your ultrasound is positive for pancreatic adenocarcinoma. Please follow-up with Dr. Hope and Dr. Wetzel at your appoints below. As for your drain, you can intermittently clamp it at home to increase biliary absorption. If any you notice any signs of jaundice (yellowing of the skin), you should unclamp the drain. If you start experiencing severe abdominal pain, and a temperature >101 F, or leakage around the drain site, please return tot he emergency department immediately. You will continue to take the antibiotics levofloxacin 500 mg daily and metronidazole 500 mg every 8 hours for 2 more days. Please continue taking your HIV medications, Prezcobix (darunavir 800 mg / cobicstat 150 mg) and Descovy (emtricitabine 200 mg/tenofovir alafenamide 25 mg) daily. Please follow-up with your primary care physician at your convenience. You were noted to have arthritis in your medical history. Please continue to take your Oxycodone ER 60 mg every 12 hours and hydromorphone 8 mg every 6 hours as needed. Please follow-up with your primary care physician at your convenience. You were found to have two 2x2 cm lesions noted in your pancreas. There were two failed ERCP procedures, but an ampulla biopsy was obtained, as well as a percutaneous cholecystostomy tube placed that is draining bile. Fine needle aspiration biopsies taken during your ultrasound is positive for pancreatic adenocarcinoma. Please follow-up with Dr. Hope and Dr. Wetzel at your appoints below. As for your drain, you can intermittently clamp it at home to increase biliary absorption. If any you notice any signs of jaundice (yellowing of the skin), you should unclamp the drain. If you start experiencing severe abdominal pain, and a temperature >101 F, or leakage around the drain site, please return tot he emergency department immediately. You will continue to take the antibiotics levofloxacin 500 mg daily and metronidazole 500 mg every 8 hours for 2 more days. Please follow-up with your primary care physician for meningococcal vaccines for post-splenectomy management (you received haemophilus and pneumococcal here).

## 2018-10-05 NOTE — PROGRESS NOTE ADULT - PROBLEM SELECTOR PROBLEM 2
Transaminitis
HIV (human immunodeficiency virus infection)
Transaminitis
Transaminitis

## 2018-10-05 NOTE — PROGRESS NOTE ADULT - SUBJECTIVE AND OBJECTIVE BOX
SUBJECTIVE:     Patient reports mild discomfort at PTC drain site. Drain output 550 mL overnight (1000 mL over 24 hours). Tbili continuing to trend down to 4.0 this AM (4.4 yesterday).     darunavir 800 mG/cobicstat 150 mG 1 Tablet(s) Oral every 24 hours  emtricitabine 200 mG/tenofovir alafenamide 25 mG (DESCOVY) Tablet 1 Tablet(s) Oral every 24 hours  levoFLOXacin  Tablet 500 milliGRAM(s) Oral every 24 hours  metroNIDAZOLE    Tablet 500 milliGRAM(s) Oral every 8 hours      Vital Signs Last 24 Hrs  T(C): 36.8 (05 Oct 2018 05:38), Max: 36.8 (04 Oct 2018 21:24)  T(F): 98.2 (05 Oct 2018 05:38), Max: 98.2 (04 Oct 2018 21:24)  HR: 72 (05 Oct 2018 05:38) (68 - 74)  BP: 131/71 (05 Oct 2018 05:38) (107/65 - 137/73)  BP(mean): --  RR: 16 (05 Oct 2018 05:38) (14 - 16)  SpO2: 98% (05 Oct 2018 05:38) (96% - 98%)  I&O's Detail    04 Oct 2018 07:01  -  05 Oct 2018 07:00  --------------------------------------------------------  IN:  Total IN: 0 mL    OUT:    Drain: 1000 mL  Total OUT: 1000 mL    Total NET: -1000 mL      General: NAD, resting comfortably in bed  C/V: NSR  Pulm: Nonlabored breathing, no respiratory distress  Abd: soft, NT/ND. PTC drain in place, draining bile.   Extrem: WWP, no edema, SCDs in place        LABS:                        9.8    7.0   )-----------( 147      ( 05 Oct 2018 05:44 )             29.4     10-05    133<L>  |  96  |  14  ----------------------------<  145<H>  4.1   |  26  |  0.98    Ca    9.6      05 Oct 2018 05:44  Phos  3.1     10-05  Mg     1.9     10-05    TPro  6.1  /  Alb  3.6  /  TBili  4.0<H>  /  DBili  x   /  AST  28  /  ALT  48<H>  /  AlkPhos  399<H>  10-05    PT/INR - ( 05 Oct 2018 05:44 )   PT: 14.9 sec;   INR: 1.33          PTT - ( 05 Oct 2018 05:44 )  PTT:31.1 sec      RADIOLOGY & ADDITIONAL STUDIES:

## 2018-10-05 NOTE — PROGRESS NOTE ADULT - PROBLEM SELECTOR PLAN 1
Patient had a CT scan due to his abnormal labs and complaints w/ results reporting a 3cm x 2cm x 2cm mass on his pancreas w/ associated jaundice and abdominal cramping. S/p failed ERCPx2, unable to stent. CT chest 9/28 negative for metastatic disease. CA 19-9 elevated, CEA 3.4. EUS revealed 20x20 mm and [id number] cystic/solid lesions from pancreas. FNA and ampulla biopsy path pending.  - F/u GI recs  - F/u surg recs  - F/u heme onc recs  - trend LFTs  - F/u ampulla biopsy  - F/u FNA Patient had a CT scan due to his abnormal labs and complaints w/ results reporting a 3cm x 2cm x 2cm mass on his pancreas w/ associated jaundice and abdominal cramping. S/p failed ERCPx2, unable to stent. CT chest 9/28 negative for metastatic disease. CA 19-9 elevated, CEA 3.4. EUS revealed 20x20 mm and [id number] cystic/solid lesions from pancreas. FNA and ampulla biopsy path pending.  - F/u GI recs  - F/u surg recs  - F/u heme onc recs  - trend LFTs  - F/u ampulla biopsy  - F/u FNA  - Pending port placement for neoadjuvant chemo

## 2018-10-05 NOTE — PROGRESS NOTE ADULT - SUBJECTIVE AND OBJECTIVE BOX
[facility]    GALINDO FERNANDEZ  [id number]    INTERVAL Hx: The patient feels better. No pain. He is able to ambulate. No SOB. No nausea or vomiting    HPI: 57 year old man with past medical history of HIV (CD4 195, viral load undetectable), GERD, osteoarthiritis, presented with abnormal LFT's and jaundice, outpatient scan showing pancreatic mass.   Now s/p EUS that showed multifocal pancreatic lesions. S/p FNA-pathology pending. S/p PTC drain placement by IR.    PAST MEDICAL & SURGICAL HISTORY:  Bilateral sciatica  Arthritis  Gastric reflux  HIV (human immunodeficiency virus infection)  H/O inguinal hernia repair  H/O laminectomy  No significant past surgical history      MEDICATIONS  (STANDING):  ALPRAZolam 1 milliGRAM(s) Oral every 24 hours  buPROPion XL . 150 milliGRAM(s) Oral every 24 hours  darunavir 800 mG/cobicstat 150 mG 1 Tablet(s) Oral every 24 hours  dextrose 5%. 1000 milliLiter(s) IV Continuous <Continuous>  dextrose 50% Injectable 12.5 Gram(s) IV Push once  dextrose 50% Injectable 25 Gram(s) IV Push once  dextrose 50% Injectable 25 Gram(s) IV Push once  emtricitabine 200 mG/tenofovir alafenamide 25 mG (DESCOVY) Tablet 1 Tablet(s) Oral every 24 hours  heparin  Injectable 5000 Unit(s) SubCutaneous every 8 hours  insulin lispro (HumaLOG) corrective regimen sliding scale   SubCutaneous Before meals and at bedtime  insulin lispro Injectable (HumaLOG) 3 Unit(s) SubCutaneous three times a day before meals  levoFLOXacin  Tablet 500 milliGRAM(s) Oral every 24 hours  metroNIDAZOLE    Tablet 500 milliGRAM(s) Oral every 8 hours  oxyCODONE  ER Tablet 60 milliGRAM(s) Oral every 12 hours  polyethylene glycol 3350 17 Gram(s) Oral once      Allergies    penicillins (Angioedema)    Intolerances          FAMILY HISTORY:  No pertinent family history in first degree relatives      ROS:  Jaundice, fatigue  + 15 Lb weight loss, + anorexia  No SOB or CP  No nausea or vomiting  Intermittent abdominal pain   No history of easy bruising/bleeding.  No leg pain or leg swelling.    ROS is otherwise negative.    Physical exam:       Vital Signs Last 24 Hrs  T(C): 36.8 (05 Oct 2018 05:38), Max: 36.8 (04 Oct 2018 21:24)  T(F): 98.2 (05 Oct 2018 05:38), Max: 98.2 (04 Oct 2018 21:24)  HR: 72 (05 Oct 2018 05:38) (68 - 74)  BP: 131/71 (05 Oct 2018 05:38) (107/65 - 137/73)  BP(mean): --  RR: 16 (05 Oct 2018 05:38) (14 - 16)  SpO2: 98% (05 Oct 2018 05:38) (96% - 98%)  thin, ill appearing man  HEENT: PERRLA, pink mucosae  No palpable lymphadenopathy  Cardiovascular: Regular rhythm/rate, no murmurs, rubs, gallops  Respiratory: clear to auscultation B/L  Abdomen: soft, non tender, non distended, no palpable masses, PTC drain in place  Extremities:  no peripheral edema  Skin: warm, no obvious lesions on visible skin    LABS:                         9.8    7.0   )-----------( 147      ( 05 Oct 2018 05:44 )             29.4         CBC Full  -  ( 05 Oct 2018 05:44 )  WBC Count : 7.0 K/uL  Hemoglobin : 9.8 g/dL  Hematocrit : 29.4 %  Platelet Count - Automated : 147 K/uL  Mean Cell Volume : 94.5 fL  Mean Cell Hemoglobin : 31.5 pg  Mean Cell Hemoglobin Concentration : 33.3 g/dL  Auto Neutrophil # : x  Auto Lymphocyte # : x  Auto Monocyte # : x  Auto Eosinophil # : x  Auto Basophil # : x  Auto Neutrophil % : x  Auto Lymphocyte % : x  Auto Monocyte % : x  Auto Eosinophil % : x  Auto Basophil % : x        10-05    133<L>  |  96  |  14  ----------------------------<  145<H>  4.1   |  26  |  0.98    Ca    9.6      05 Oct 2018 05:44  Phos  3.1     10-05  Mg     1.9     10-05    TPro  6.1  /  Alb  3.6  /  TBili  4.0<H>  /  DBili  x   /  AST  28  /  ALT  48<H>  /  AlkPhos  399<H>  10-05        PT/INR - ( 05 Oct 2018 05:44 )   PT: 14.9 sec;   INR: 1.33          PTT - ( 05 Oct 2018 05:44 )  PTT:31.1 sec  PERTINENT IMAGING STUDIES: reviewed    ASSESSMENT:    Biliary obstruction  Pancreatic mass-adenocarcinoma    PLAN:    Clinically stable   S/p PTC placement, bili/ALP are improving  We reviewed and discussed pathology (FNA of pancreatic tail mass)  + adenocarcinoma    No obvious evidence of metastatic disease  As d/w Dr. Hope we will consider neoadjuvant and then have the patient reevaluated for possible surgery  His ECOG PS is decent, but I am concerned about his fairly low CD4 count and the risk of complications with chemotherapy.  At a minimum he will need dose modification, likely regimen will be Abraxane/Clemons    Will arrange outpatient follow up    Thank you    Juvenal Wetzel MD  [phone]

## 2018-10-05 NOTE — PROGRESS NOTE ADULT - PROBLEM SELECTOR PLAN 3
- C/w prezcobix and descovy home meds
Patient with extensive pain medication for arthritis   - c/w home medications to avoid withdrawal:  - hydromorphone 8mg q 6 hours prn for breakthrough  - oxycontin 60mg q 12 hours standing

## 2018-10-05 NOTE — PROGRESS NOTE ADULT - PROBLEM SELECTOR PLAN 9
F: not necessary  E: replete as necessary  N: Full liquid diet
F: not necessary  E: replete as necessary  N: Full liquid diet
1) PCP Contacted on Admission: (Y/N) --> Name & Phone #: Dr. Sommers  2) Date of Contact with PCP:  3) PCP Contacted at Discharge: (Y/N)  4) Summary of Handoff Given to PCP:   5) Post-Discharge Appointment Date and Location:

## 2018-10-05 NOTE — PROGRESS NOTE ADULT - SUBJECTIVE AND OBJECTIVE BOX
Pt seen and examined at bedside. CLARITA overnight. Pt reports intermittent RUQ abdominal pain overnight, now improved. Denies nausea, vomiting. Tolerating diet.     Allergies    penicillins (Angioedema)    Intolerances      MEDICATIONS:  MEDICATIONS  (STANDING):  ALPRAZolam 1 milliGRAM(s) Oral at bedtime  buPROPion XL . 150 milliGRAM(s) Oral every 24 hours  darunavir 800 mG/cobicstat 150 mG 1 Tablet(s) Oral every 24 hours  dextrose 5%. 1000 milliLiter(s) (50 mL/Hr) IV Continuous <Continuous>  dextrose 50% Injectable 12.5 Gram(s) IV Push once  dextrose 50% Injectable 25 Gram(s) IV Push once  dextrose 50% Injectable 25 Gram(s) IV Push once  emtricitabine 200 mG/tenofovir alafenamide 25 mG (DESCOVY) Tablet 1 Tablet(s) Oral every 24 hours  haemophilus B conjugate Vaccine 0.5 milliLiter(s) IntraMuscular once  insulin glargine Injectable (LANTUS) 10 Unit(s) SubCutaneous at bedtime  insulin lispro (HumaLOG) corrective regimen sliding scale   SubCutaneous Before meals and at bedtime  levoFLOXacin  Tablet 500 milliGRAM(s) Oral every 24 hours  metroNIDAZOLE    Tablet 500 milliGRAM(s) Oral every 8 hours  oxyCODONE  ER Tablet 60 milliGRAM(s) Oral every 12 hours  pneumococcal  13 Vaccine (PREVNAR 13) 0.5 milliLiter(s) IntraMuscular once    MEDICATIONS  (PRN):  dextrose 40% Gel 15 Gram(s) Oral once PRN Blood Glucose LESS THAN 70 milliGRAM(s)/deciliter  glucagon  Injectable 1 milliGRAM(s) IntraMuscular once PRN Glucose LESS THAN 70 milligrams/deciliter  HYDROmorphone   Tablet 8 milliGRAM(s) Oral every 6 hours PRN Moderate Pain (4 - 6)    Vital Signs Last 24 Hrs  T(C): 36.8 (05 Oct 2018 05:38), Max: 36.8 (04 Oct 2018 21:24)  T(F): 98.2 (05 Oct 2018 05:38), Max: 98.2 (04 Oct 2018 21:24)  HR: 72 (05 Oct 2018 05:38) (68 - 74)  BP: 131/71 (05 Oct 2018 05:38) (107/65 - 137/73)  BP(mean): --  RR: 16 (05 Oct 2018 05:38) (14 - 16)  SpO2: 98% (05 Oct 2018 05:38) (96% - 98%)    10-04 @ 07:01  -  10-05 @ 07:00  --------------------------------------------------------  IN: 0 mL / OUT: 1000 mL / NET: -1000 mL    PHYSICAL EXAM:    General: Well developed; well nourished; in no acute distress  HEENT: MMM, scleral icterus  Gastrointestinal: Soft non-tender non-distended; No rebound or guarding; biliary drain in place with bilious drainage  Skin: Warm and dry. No obvious rash. Jaundice improving    LABS:                        9.8    7.0   )-----------( 147      ( 05 Oct 2018 05:44 )             29.4     10-05    133<L>  |  96  |  14  ----------------------------<  145<H>  4.1   |  26  |  0.98    Ca    9.6      05 Oct 2018 05:44  Phos  3.1     10-05  Mg     1.9     10-05    TPro  6.1  /  Alb  3.6  /  TBili  4.0<H>  /  DBili  x   /  AST  28  /  ALT  48<H>  /  AlkPhos  399<H>  10-05    PT/INR - ( 05 Oct 2018 05:44 )   PT: 14.9 sec;   INR: 1.33       PTT - ( 05 Oct 2018 05:44 )  PTT:31.1 sec    Culture - Body Fluid with Gram Stain (collected 02 Oct 2018 12:35)  Source: .Body Fluid Bile  Gram Stain (03 Oct 2018 09:48):    No organisms seen    Few White blood cells  Preliminary Report (03 Oct 2018 09:58):    No growth to date.    RADIOLOGY & ADDITIONAL STUDIES: No new radiologic studies.

## 2018-10-05 NOTE — PROGRESS NOTE ADULT - PROBLEM SELECTOR PLAN 10
1) PCP Contacted on Admission: (Y/N) --> Name & Phone #: Dr. Sommers  2) Date of Contact with PCP:  3) PCP Contacted at Discharge: (Y/N)  4) Summary of Handoff Given to PCP:   5) Post-Discharge Appointment Date and Location:
1) PCP Contacted on Admission: (Y/N) --> Name & Phone #: Dr. Sommers  2) Date of Contact with PCP:  3) PCP Contacted at Discharge: (Y/N)  4) Summary of Handoff Given to PCP:   5) Post-Discharge Appointment Date and Location:

## 2018-10-05 NOTE — DISCHARGE NOTE ADULT - HOSPITAL COURSE
57 year old male with h/o HIV (CD4 195, VL UD),  B/L sciatica referred by his PMD for abnormal LFTs and CT scan showing a 2X3X3 cm mass on the pancreas obstructing the pancreatic duct. Bilirubin on admission 11 (direct 8.4). Patient was visibly jaundiced with signs of cachexia. CT chest negative for thoracic metastatic disease. Underwent an unsuccessful ERCP #1. Sphincterotomy was performed and ERCP #2 was tried again and was also unsuccessful for stent placement. Ampulla biopsy was benign, IR procedure placed a biliary drain, and perc cholangiography showed minimal to mild dilatation of the peripheral right and left bile ducts. There is moderate dilatation of the right and left main hepatic ducts and the proximal bile duct above the level of obstruction in the pancreatic head. EUS performed by GI, which revealed two 2x2cm cystic/solid lesions, one at uncinate process, and one at the tail of pancreas. FNA obtained. Dr. Hatfield (onc) consulted, recommended neoadjuvant chemotherapy prior to surgery. Patient received neisseria, haemophilus, and PCV13 vaccines in anticipation to splenectomy. Patient stable for discharge with outpatient follow-up with Dr. Hope (surgery), Dr. Ricks (GI), and Dr. Hatfiled (oncology). 57 year old male with h/o HIV (CD4 195, VL UD),  B/L sciatica referred by his PMD for abnormal LFTs and CT scan showing a 2X3X3 cm mass on the pancreas obstructing the pancreatic duct. Bilirubin on admission 11 (direct 8.4). Patient was visibly jaundiced with signs of cachexia. CT chest negative for thoracic metastatic disease. Underwent an unsuccessful ERCP #1. Sphincterotomy was performed and ERCP #2 was tried again and was also unsuccessful for stent placement. Ampulla biopsy was benign, IR procedure placed a biliary drain, and perc cholangiography showed minimal to mild dilatation of the peripheral right and left bile ducts. There is moderate dilatation of the right and left main hepatic ducts and the proximal bile duct above the level of obstruction in the pancreatic head. EUS performed by GI, which revealed two 2x2cm cystic/solid lesions, one at uncinate process, and one at the tail of pancreas. FNA obtained, positive for pancreatic adenocarcinoma. Mediport placed 10/5, day of discharge.. Dr. Wetzel (onc) consulted, recommended neoadjuvant chemotherapy prior to surgery. Patient received neisseria, haemophilus, and PCV13 vaccines in anticipation to splenectomy. Patient stable for discharge with outpatient follow-up with Dr. Hope (surgery), Dr. Ricks (GI), and Dr. Hatfield (oncology). 57 year old male with h/o HIV (CD4 195, VL UD),  B/L sciatica referred by his PMD for abnormal LFTs and CT scan showing a 2X3X3 cm mass on the pancreas obstructing the pancreatic duct. Bilirubin on admission 11 (direct 8.4). Patient was visibly jaundiced with signs of cachexia. CT chest negative for thoracic metastatic disease. Underwent an unsuccessful ERCP #1. Sphincterotomy was performed and ERCP #2 was tried again and was also unsuccessful for stent placement. Ampulla biopsy was benign, IR procedure placed a biliary drain, and perc cholangiography showed minimal to mild dilatation of the peripheral right and left bile ducts. There is moderate dilatation of the right and left main hepatic ducts and the proximal bile duct above the level of obstruction in the pancreatic head. EUS performed by GI, which revealed two 2x2cm cystic/solid lesions, one at uncinate process, and one at the tail of pancreas. FNA obtained, positive for pancreatic adenocarcinoma. Mediport placed 10/5, day of discharge.. Dr. Wetzel (onc) consulted, recommended neoadjuvant chemotherapy prior to surgery. Patient received haemophilus and PCV13 vaccines in anticipation to splenectomy. Patient stable for discharge with outpatient follow-up with Dr. Hope (surgery), Dr. Ricks (GI), and Dr. Hatfield (oncology).

## 2018-10-06 VITALS
SYSTOLIC BLOOD PRESSURE: 113 MMHG | DIASTOLIC BLOOD PRESSURE: 70 MMHG | OXYGEN SATURATION: 97 % | TEMPERATURE: 98 F | HEART RATE: 78 BPM | RESPIRATION RATE: 16 BRPM

## 2018-10-06 LAB
GLUCOSE BLDC GLUCOMTR-MCNC: 189 MG/DL — HIGH (ref 70–99)
GLUCOSE BLDC GLUCOMTR-MCNC: 320 MG/DL — HIGH (ref 70–99)

## 2018-10-06 PROCEDURE — 87075 CULTR BACTERIA EXCEPT BLOOD: CPT

## 2018-10-06 PROCEDURE — 90734 MENACWYD/MENACWYCRM VACC IM: CPT

## 2018-10-06 PROCEDURE — 74329 X-RAY FOR PANCREAS ENDOSCOPY: CPT

## 2018-10-06 PROCEDURE — 82962 GLUCOSE BLOOD TEST: CPT

## 2018-10-06 PROCEDURE — 85730 THROMBOPLASTIN TIME PARTIAL: CPT

## 2018-10-06 PROCEDURE — 87205 SMEAR GRAM STAIN: CPT

## 2018-10-06 PROCEDURE — 87070 CULTURE OTHR SPECIMN AEROBIC: CPT

## 2018-10-06 PROCEDURE — 99153 MOD SED SAME PHYS/QHP EA: CPT | Mod: 59

## 2018-10-06 PROCEDURE — 86900 BLOOD TYPING SEROLOGIC ABO: CPT

## 2018-10-06 PROCEDURE — 86901 BLOOD TYPING SEROLOGIC RH(D): CPT

## 2018-10-06 PROCEDURE — 84100 ASSAY OF PHOSPHORUS: CPT

## 2018-10-06 PROCEDURE — 83735 ASSAY OF MAGNESIUM: CPT

## 2018-10-06 PROCEDURE — 36415 COLL VENOUS BLD VENIPUNCTURE: CPT

## 2018-10-06 PROCEDURE — 83036 HEMOGLOBIN GLYCOSYLATED A1C: CPT

## 2018-10-06 PROCEDURE — C1769: CPT

## 2018-10-06 PROCEDURE — 86850 RBC ANTIBODY SCREEN: CPT

## 2018-10-06 PROCEDURE — 99152 MOD SED SAME PHYS/QHP 5/>YRS: CPT

## 2018-10-06 PROCEDURE — 80053 COMPREHEN METABOLIC PANEL: CPT

## 2018-10-06 PROCEDURE — 82247 BILIRUBIN TOTAL: CPT

## 2018-10-06 PROCEDURE — 85025 COMPLETE CBC W/AUTO DIFF WBC: CPT

## 2018-10-06 PROCEDURE — C1788: CPT

## 2018-10-06 PROCEDURE — 90670 PCV13 VACCINE IM: CPT

## 2018-10-06 PROCEDURE — 85027 COMPLETE CBC AUTOMATED: CPT

## 2018-10-06 PROCEDURE — 47534 PLMT BILIARY DRAINAGE CATH: CPT

## 2018-10-06 PROCEDURE — 86301 IMMUNOASSAY TUMOR CA 19-9: CPT

## 2018-10-06 PROCEDURE — 36561 INSERT TUNNELED CV CATH: CPT

## 2018-10-06 PROCEDURE — 90647 HIB PRP-OMP VACC 3 DOSE IM: CPT

## 2018-10-06 PROCEDURE — 83690 ASSAY OF LIPASE: CPT

## 2018-10-06 PROCEDURE — 88305 TISSUE EXAM BY PATHOLOGIST: CPT

## 2018-10-06 PROCEDURE — 82378 CARCINOEMBRYONIC ANTIGEN: CPT

## 2018-10-06 PROCEDURE — 87536 HIV-1 QUANT&REVRSE TRNSCRPJ: CPT

## 2018-10-06 PROCEDURE — 71046 X-RAY EXAM CHEST 2 VIEWS: CPT

## 2018-10-06 PROCEDURE — 77001 FLUOROGUIDE FOR VEIN DEVICE: CPT

## 2018-10-06 PROCEDURE — 99285 EMERGENCY DEPT VISIT HI MDM: CPT

## 2018-10-06 PROCEDURE — 99232 SBSQ HOSP IP/OBS MODERATE 35: CPT

## 2018-10-06 PROCEDURE — 88173 CYTOPATH EVAL FNA REPORT: CPT

## 2018-10-06 PROCEDURE — 85610 PROTHROMBIN TIME: CPT

## 2018-10-06 PROCEDURE — C1887: CPT

## 2018-10-06 PROCEDURE — C1729: CPT

## 2018-10-06 PROCEDURE — C1894: CPT

## 2018-10-06 PROCEDURE — 76937 US GUIDE VASCULAR ACCESS: CPT

## 2018-10-06 PROCEDURE — 71250 CT THORAX DX C-: CPT

## 2018-10-06 PROCEDURE — 82248 BILIRUBIN DIRECT: CPT

## 2018-10-06 RX ORDER — ONDANSETRON 8 MG/1
4 TABLET, FILM COATED ORAL ONCE
Qty: 0 | Refills: 0 | Status: COMPLETED | OUTPATIENT
Start: 2018-10-06 | End: 2018-10-06

## 2018-10-06 RX ADMIN — Medication 0.5 MILLILITER(S): at 14:35

## 2018-10-06 RX ADMIN — NEISSERIA MENINGITIDIS GROUP A CAPSULAR POLYSACCHARIDE TETANUS TOXOID CONJUGATE ANTIGEN, NEISSERIA MENINGITIDIS GROUP C CAPSULAR POLYSACCHARIDE TETANUS TOXOID CONJUGATE ANTIGEN, NEISSERIA MENINGITIDIS GROUP Y CAPSULAR POLYSACCHARIDE TETANUS TOXOID CONJUGATE ANTIGEN, AND NEISSERIA MENINGITIDIS GROUP W-135 CAPSULAR POLYSACCHARIDE TETANUS TOXOID CONJUGATE ANTIGEN 0.5 MILLILITER(S): 10; 10; 10; 10 INJECTION, SOLUTION INTRAMUSCULAR at 14:33

## 2018-10-06 RX ADMIN — Medication 500 MILLIGRAM(S): at 06:35

## 2018-10-06 RX ADMIN — Medication 500 MILLIGRAM(S): at 14:32

## 2018-10-06 RX ADMIN — Medication 8: at 12:48

## 2018-10-06 RX ADMIN — OXYCODONE HYDROCHLORIDE 60 MILLIGRAM(S): 5 TABLET ORAL at 06:36

## 2018-10-06 RX ADMIN — ONDANSETRON 4 MILLIGRAM(S): 8 TABLET, FILM COATED ORAL at 08:54

## 2018-10-06 RX ADMIN — Medication 2: at 08:28

## 2018-10-09 LAB — N. MENINGITIDIS IGG, VACCINE: ABNORMAL

## 2018-10-18 DIAGNOSIS — R73.9 HYPERGLYCEMIA, UNSPECIFIED: ICD-10-CM

## 2018-10-18 DIAGNOSIS — Z98.890 OTHER SPECIFIED POSTPROCEDURAL STATES: ICD-10-CM

## 2018-10-18 DIAGNOSIS — K21.9 GASTRO-ESOPHAGEAL REFLUX DISEASE WITHOUT ESOPHAGITIS: ICD-10-CM

## 2018-10-18 DIAGNOSIS — C25.9 MALIGNANT NEOPLASM OF PANCREAS, UNSPECIFIED: ICD-10-CM

## 2018-10-18 DIAGNOSIS — F32.9 MAJOR DEPRESSIVE DISORDER, SINGLE EPISODE, UNSPECIFIED: ICD-10-CM

## 2018-10-18 DIAGNOSIS — R17 UNSPECIFIED JAUNDICE: ICD-10-CM

## 2018-10-18 DIAGNOSIS — G89.29 OTHER CHRONIC PAIN: ICD-10-CM

## 2018-10-18 DIAGNOSIS — F41.9 ANXIETY DISORDER, UNSPECIFIED: ICD-10-CM

## 2018-10-18 DIAGNOSIS — Z21 ASYMPTOMATIC HUMAN IMMUNODEFICIENCY VIRUS [HIV] INFECTION STATUS: ICD-10-CM

## 2018-10-18 DIAGNOSIS — M19.90 UNSPECIFIED OSTEOARTHRITIS, UNSPECIFIED SITE: ICD-10-CM

## 2018-10-18 DIAGNOSIS — K31.9 DISEASE OF STOMACH AND DUODENUM, UNSPECIFIED: ICD-10-CM

## 2018-10-18 DIAGNOSIS — K25.9 GASTRIC ULCER, UNSPECIFIED AS ACUTE OR CHRONIC, WITHOUT HEMORRHAGE OR PERFORATION: ICD-10-CM

## 2018-10-18 DIAGNOSIS — M54.32 SCIATICA, LEFT SIDE: ICD-10-CM

## 2018-10-18 DIAGNOSIS — K83.1 OBSTRUCTION OF BILE DUCT: ICD-10-CM

## 2018-10-18 DIAGNOSIS — R74.0 NONSPECIFIC ELEVATION OF LEVELS OF TRANSAMINASE AND LACTIC ACID DEHYDROGENASE [LDH]: ICD-10-CM

## 2018-10-18 DIAGNOSIS — M54.31 SCIATICA, RIGHT SIDE: ICD-10-CM

## 2018-10-18 DIAGNOSIS — Z87.891 PERSONAL HISTORY OF NICOTINE DEPENDENCE: ICD-10-CM

## 2018-10-18 DIAGNOSIS — E43 UNSPECIFIED SEVERE PROTEIN-CALORIE MALNUTRITION: ICD-10-CM

## 2022-07-06 NOTE — DISCHARGE NOTE ADULT - CLICK TO LAUNCH ORM
NST Performed due to fetal growth restriction.  Dr. Badillo reviewed efm tracing. See NST/BPP Doc Flowsheet tab.     .
